# Patient Record
Sex: MALE | Race: OTHER | HISPANIC OR LATINO | URBAN - METROPOLITAN AREA
[De-identification: names, ages, dates, MRNs, and addresses within clinical notes are randomized per-mention and may not be internally consistent; named-entity substitution may affect disease eponyms.]

---

## 2018-01-13 ENCOUNTER — EMERGENCY (EMERGENCY)
Facility: HOSPITAL | Age: 53
LOS: 1 days | Discharge: HOME | End: 2018-01-13
Attending: EMERGENCY MEDICINE
Payer: SELF-PAY

## 2018-01-13 VITALS
DIASTOLIC BLOOD PRESSURE: 90 MMHG | RESPIRATION RATE: 18 BRPM | TEMPERATURE: 98 F | SYSTOLIC BLOOD PRESSURE: 151 MMHG | OXYGEN SATURATION: 100 % | HEART RATE: 84 BPM

## 2018-01-13 VITALS
SYSTOLIC BLOOD PRESSURE: 158 MMHG | DIASTOLIC BLOOD PRESSURE: 98 MMHG | RESPIRATION RATE: 20 BRPM | TEMPERATURE: 98 F | OXYGEN SATURATION: 99 % | HEART RATE: 88 BPM

## 2018-01-13 PROCEDURE — 99283 EMERGENCY DEPT VISIT LOW MDM: CPT

## 2018-01-13 RX ORDER — IBUPROFEN 200 MG
1 TABLET ORAL
Qty: 15 | Refills: 0
Start: 2018-01-13 | End: 2018-01-17

## 2018-01-13 RX ORDER — CYCLOBENZAPRINE HYDROCHLORIDE 10 MG/1
1 TABLET, FILM COATED ORAL
Qty: 5 | Refills: 0
Start: 2018-01-13 | End: 2018-01-17

## 2018-01-13 RX ORDER — IBUPROFEN 200 MG
600 TABLET ORAL ONCE
Qty: 0 | Refills: 0 | Status: COMPLETED | OUTPATIENT
Start: 2018-01-13 | End: 2018-01-13

## 2018-01-13 RX ADMIN — Medication 600 MILLIGRAM(S): at 19:48
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
## 2018-01-13 NOTE — ED PROVIDER NOTE - CHPI ED SYMPTOMS NEG
no fever, no chills, no visual changes, no shortness of breath, no cough, no chest pain, no palpitations, no nausea, no vomiting, no diarrhea, no abd pain, no dysuria, no urinary frequency, no hematuria, no urinary/bowel incontinence, no numbness, no tingling, no weakness, no saddle anesthesia, no LOC
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
## 2020-03-30 ENCOUNTER — INPATIENT (INPATIENT)
Facility: HOSPITAL | Age: 55
LOS: 0 days | Discharge: OTHER INSTITUTION | DRG: 193 | End: 2020-03-31
Attending: INTERNAL MEDICINE | Admitting: INTERNAL MEDICINE
Payer: MEDICAID

## 2020-03-30 VITALS
WEIGHT: 214.95 LBS | TEMPERATURE: 99 F | RESPIRATION RATE: 20 BRPM | DIASTOLIC BLOOD PRESSURE: 88 MMHG | HEIGHT: 65 IN | OXYGEN SATURATION: 81 % | SYSTOLIC BLOOD PRESSURE: 137 MMHG | HEART RATE: 107 BPM

## 2020-03-31 ENCOUNTER — EMERGENCY (EMERGENCY)
Facility: HOSPITAL | Age: 55
LOS: 0 days | Discharge: HOME | End: 2020-03-31
Attending: EMERGENCY MEDICINE

## 2020-03-31 ENCOUNTER — INPATIENT (INPATIENT)
Facility: HOSPITAL | Age: 55
LOS: 11 days | Discharge: HOME | End: 2020-04-12
Attending: INTERNAL MEDICINE | Admitting: INTERNAL MEDICINE
Payer: MEDICAID

## 2020-03-31 VITALS
SYSTOLIC BLOOD PRESSURE: 130 MMHG | OXYGEN SATURATION: 90 % | DIASTOLIC BLOOD PRESSURE: 67 MMHG | TEMPERATURE: 100 F | HEART RATE: 84 BPM | RESPIRATION RATE: 26 BRPM

## 2020-03-31 VITALS
OXYGEN SATURATION: 98 % | SYSTOLIC BLOOD PRESSURE: 148 MMHG | TEMPERATURE: 98 F | RESPIRATION RATE: 20 BRPM | HEART RATE: 79 BPM | DIASTOLIC BLOOD PRESSURE: 83 MMHG

## 2020-03-31 VITALS
DIASTOLIC BLOOD PRESSURE: 79 MMHG | HEART RATE: 89 BPM | OXYGEN SATURATION: 82 % | TEMPERATURE: 100 F | RESPIRATION RATE: 18 BRPM | SYSTOLIC BLOOD PRESSURE: 131 MMHG

## 2020-03-31 VITALS — HEIGHT: 65 IN

## 2020-03-31 DIAGNOSIS — R06.02 SHORTNESS OF BREATH: ICD-10-CM

## 2020-03-31 DIAGNOSIS — A41.89 OTHER SPECIFIED SEPSIS: ICD-10-CM

## 2020-03-31 DIAGNOSIS — J96.00 ACUTE RESPIRATORY FAILURE, UNSPECIFIED WHETHER WITH HYPOXIA OR HYPERCAPNIA: ICD-10-CM

## 2020-03-31 DIAGNOSIS — Z29.9 ENCOUNTER FOR PROPHYLACTIC MEASURES, UNSPECIFIED: ICD-10-CM

## 2020-03-31 DIAGNOSIS — J18.9 PNEUMONIA, UNSPECIFIED ORGANISM: ICD-10-CM

## 2020-03-31 DIAGNOSIS — R74.0 NONSPECIFIC ELEVATION OF LEVELS OF TRANSAMINASE AND LACTIC ACID DEHYDROGENASE [LDH]: ICD-10-CM

## 2020-03-31 DIAGNOSIS — E87.1 HYPO-OSMOLALITY AND HYPONATREMIA: ICD-10-CM

## 2020-03-31 DIAGNOSIS — Z02.9 ENCOUNTER FOR ADMINISTRATIVE EXAMINATIONS, UNSPECIFIED: ICD-10-CM

## 2020-03-31 DIAGNOSIS — L40.9 PSORIASIS, UNSPECIFIED: ICD-10-CM

## 2020-03-31 DIAGNOSIS — R65.20 SEVERE SEPSIS WITHOUT SEPTIC SHOCK: ICD-10-CM

## 2020-03-31 DIAGNOSIS — U07.1 COVID-19: ICD-10-CM

## 2020-03-31 DIAGNOSIS — Z78.1 PHYSICAL RESTRAINT STATUS: ICD-10-CM

## 2020-03-31 DIAGNOSIS — J80 ACUTE RESPIRATORY DISTRESS SYNDROME: ICD-10-CM

## 2020-03-31 DIAGNOSIS — E87.2 ACIDOSIS: ICD-10-CM

## 2020-03-31 DIAGNOSIS — J12.89 OTHER VIRAL PNEUMONIA: ICD-10-CM

## 2020-03-31 LAB
4/8 RATIO: 3.13 RATIO — SIGNIFICANT CHANGE UP (ref 0.9–3.6)
ABS CD8: 170 /UL — SIGNIFICANT CHANGE UP (ref 142–740)
ALBUMIN SERPL ELPH-MCNC: 2.5 G/DL — LOW (ref 3.5–5)
ALBUMIN SERPL ELPH-MCNC: 2.9 G/DL — LOW (ref 3.5–5)
ALP SERPL-CCNC: 73 U/L — SIGNIFICANT CHANGE UP (ref 40–120)
ALP SERPL-CCNC: 80 U/L — SIGNIFICANT CHANGE UP (ref 40–120)
ALT FLD-CCNC: 57 U/L DA — SIGNIFICANT CHANGE UP (ref 10–60)
ALT FLD-CCNC: 61 U/L DA — HIGH (ref 10–60)
ANION GAP SERPL CALC-SCNC: 7 MMOL/L — SIGNIFICANT CHANGE UP (ref 5–17)
ANION GAP SERPL CALC-SCNC: 7 MMOL/L — SIGNIFICANT CHANGE UP (ref 5–17)
AST SERPL-CCNC: 74 U/L — HIGH (ref 10–40)
AST SERPL-CCNC: 75 U/L — HIGH (ref 10–40)
BASOPHILS # BLD AUTO: 0.01 K/UL — SIGNIFICANT CHANGE UP (ref 0–0.2)
BASOPHILS # BLD AUTO: 0.01 K/UL — SIGNIFICANT CHANGE UP (ref 0–0.2)
BASOPHILS NFR BLD AUTO: 0.1 % — SIGNIFICANT CHANGE UP (ref 0–2)
BASOPHILS NFR BLD AUTO: 0.1 % — SIGNIFICANT CHANGE UP (ref 0–2)
BILIRUB SERPL-MCNC: 0.3 MG/DL — SIGNIFICANT CHANGE UP (ref 0.2–1.2)
BILIRUB SERPL-MCNC: 0.4 MG/DL — SIGNIFICANT CHANGE UP (ref 0.2–1.2)
BUN SERPL-MCNC: 7 MG/DL — SIGNIFICANT CHANGE UP (ref 7–18)
BUN SERPL-MCNC: 8 MG/DL — SIGNIFICANT CHANGE UP (ref 7–18)
CALCIUM SERPL-MCNC: 8.1 MG/DL — LOW (ref 8.4–10.5)
CALCIUM SERPL-MCNC: 8.2 MG/DL — LOW (ref 8.4–10.5)
CD16+CD56+ CELLS NFR BLD: 13 % — SIGNIFICANT CHANGE UP (ref 5–23)
CD16+CD56+ CELLS NFR SPEC: 112 /UL — SIGNIFICANT CHANGE UP (ref 71–410)
CD19 BLASTS SPEC-ACNC: 70 /UL — LOW (ref 84–469)
CD19 BLASTS SPEC-ACNC: 8 % — SIGNIFICANT CHANGE UP (ref 6–24)
CD3 BLASTS SPEC-ACNC: 701 /UL — SIGNIFICANT CHANGE UP (ref 672–1870)
CD3 BLASTS SPEC-ACNC: 77 % — SIGNIFICANT CHANGE UP (ref 59–83)
CD4 %: 57 % — SIGNIFICANT CHANGE UP (ref 30–62)
CD8 %: 18 % — SIGNIFICANT CHANGE UP (ref 12–36)
CHLORIDE SERPL-SCNC: 102 MMOL/L — SIGNIFICANT CHANGE UP (ref 96–108)
CHLORIDE SERPL-SCNC: 98 MMOL/L — SIGNIFICANT CHANGE UP (ref 96–108)
CHOLEST SERPL-MCNC: 129 MG/DL — SIGNIFICANT CHANGE UP (ref 10–199)
CK SERPL-CCNC: 109 U/L — SIGNIFICANT CHANGE UP (ref 35–232)
CO2 SERPL-SCNC: 28 MMOL/L — SIGNIFICANT CHANGE UP (ref 22–31)
CO2 SERPL-SCNC: 29 MMOL/L — SIGNIFICANT CHANGE UP (ref 22–31)
CREAT SERPL-MCNC: 0.7 MG/DL — SIGNIFICANT CHANGE UP (ref 0.5–1.3)
CREAT SERPL-MCNC: 0.8 MG/DL — SIGNIFICANT CHANGE UP (ref 0.5–1.3)
CRP SERPL-MCNC: 13.94 MG/DL — HIGH (ref 0–0.4)
D DIMER BLD IA.RAPID-MCNC: 364 NG/ML DDU — HIGH
EOSINOPHIL # BLD AUTO: 0 K/UL — SIGNIFICANT CHANGE UP (ref 0–0.5)
EOSINOPHIL # BLD AUTO: 0.04 K/UL — SIGNIFICANT CHANGE UP (ref 0–0.5)
EOSINOPHIL NFR BLD AUTO: 0 % — SIGNIFICANT CHANGE UP (ref 0–6)
EOSINOPHIL NFR BLD AUTO: 0.5 % — SIGNIFICANT CHANGE UP (ref 0–6)
FERRITIN SERPL-MCNC: 2159 NG/ML — HIGH (ref 30–400)
FLU A RESULT: SIGNIFICANT CHANGE UP
FLU A RESULT: SIGNIFICANT CHANGE UP
FLUAV AG NPH QL: SIGNIFICANT CHANGE UP
FLUBV AG NPH QL: SIGNIFICANT CHANGE UP
FOLATE SERPL-MCNC: 12.7 NG/ML — SIGNIFICANT CHANGE UP
GLUCOSE SERPL-MCNC: 125 MG/DL — HIGH (ref 70–99)
GLUCOSE SERPL-MCNC: 143 MG/DL — HIGH (ref 70–99)
HBA1C BLD-MCNC: 7.3 % — HIGH (ref 4–5.6)
HCT VFR BLD CALC: 41.5 % — SIGNIFICANT CHANGE UP (ref 39–50)
HCT VFR BLD CALC: 41.6 % — SIGNIFICANT CHANGE UP (ref 39–50)
HDLC SERPL-MCNC: 37 MG/DL — LOW
HGB BLD-MCNC: 14.3 G/DL — SIGNIFICANT CHANGE UP (ref 13–17)
HGB BLD-MCNC: 14.8 G/DL — SIGNIFICANT CHANGE UP (ref 13–17)
IMM GRANULOCYTES NFR BLD AUTO: 0.5 % — SIGNIFICANT CHANGE UP (ref 0–1.5)
IMM GRANULOCYTES NFR BLD AUTO: 0.7 % — SIGNIFICANT CHANGE UP (ref 0–1.5)
INR BLD: 1.32 RATIO — HIGH (ref 0.88–1.16)
LACTATE SERPL-SCNC: 1.4 MMOL/L — SIGNIFICANT CHANGE UP (ref 0.7–2)
LDH SERPL L TO P-CCNC: 615 U/L — HIGH (ref 120–225)
LIPID PNL WITH DIRECT LDL SERPL: 61 MG/DL — SIGNIFICANT CHANGE UP
LYMPHOCYTES # BLD AUTO: 0.99 K/UL — LOW (ref 1–3.3)
LYMPHOCYTES # BLD AUTO: 1.06 K/UL — SIGNIFICANT CHANGE UP (ref 1–3.3)
LYMPHOCYTES # BLD AUTO: 13.3 % — SIGNIFICANT CHANGE UP (ref 13–44)
LYMPHOCYTES # BLD AUTO: 13.9 % — SIGNIFICANT CHANGE UP (ref 13–44)
MAGNESIUM SERPL-MCNC: 2.5 MG/DL — SIGNIFICANT CHANGE UP (ref 1.6–2.6)
MCHC RBC-ENTMCNC: 29.1 PG — SIGNIFICANT CHANGE UP (ref 27–34)
MCHC RBC-ENTMCNC: 29.9 PG — SIGNIFICANT CHANGE UP (ref 27–34)
MCHC RBC-ENTMCNC: 34.5 GM/DL — SIGNIFICANT CHANGE UP (ref 32–36)
MCHC RBC-ENTMCNC: 35.6 GM/DL — SIGNIFICANT CHANGE UP (ref 32–36)
MCV RBC AUTO: 84 FL — SIGNIFICANT CHANGE UP (ref 80–100)
MCV RBC AUTO: 84.5 FL — SIGNIFICANT CHANGE UP (ref 80–100)
MONOCYTES # BLD AUTO: 0.25 K/UL — SIGNIFICANT CHANGE UP (ref 0–0.9)
MONOCYTES # BLD AUTO: 0.35 K/UL — SIGNIFICANT CHANGE UP (ref 0–0.9)
MONOCYTES NFR BLD AUTO: 3.4 % — SIGNIFICANT CHANGE UP (ref 2–14)
MONOCYTES NFR BLD AUTO: 4.6 % — SIGNIFICANT CHANGE UP (ref 2–14)
NEUTROPHILS # BLD AUTO: 6.09 K/UL — SIGNIFICANT CHANGE UP (ref 1.8–7.4)
NEUTROPHILS # BLD AUTO: 6.15 K/UL — SIGNIFICANT CHANGE UP (ref 1.8–7.4)
NEUTROPHILS NFR BLD AUTO: 80.7 % — HIGH (ref 43–77)
NEUTROPHILS NFR BLD AUTO: 82.2 % — HIGH (ref 43–77)
NRBC # BLD: 0 /100 WBCS — SIGNIFICANT CHANGE UP (ref 0–0)
NRBC # BLD: 0 /100 WBCS — SIGNIFICANT CHANGE UP (ref 0–0)
PHOSPHATE SERPL-MCNC: 2.4 MG/DL — LOW (ref 2.5–4.5)
PLATELET # BLD AUTO: 232 K/UL — SIGNIFICANT CHANGE UP (ref 150–400)
PLATELET # BLD AUTO: 243 K/UL — SIGNIFICANT CHANGE UP (ref 150–400)
POTASSIUM SERPL-MCNC: 3.6 MMOL/L — SIGNIFICANT CHANGE UP (ref 3.5–5.3)
POTASSIUM SERPL-MCNC: 3.8 MMOL/L — SIGNIFICANT CHANGE UP (ref 3.5–5.3)
POTASSIUM SERPL-SCNC: 3.6 MMOL/L — SIGNIFICANT CHANGE UP (ref 3.5–5.3)
POTASSIUM SERPL-SCNC: 3.8 MMOL/L — SIGNIFICANT CHANGE UP (ref 3.5–5.3)
PROCALCITONIN SERPL-MCNC: 0.11 NG/ML — HIGH (ref 0.02–0.1)
PROT SERPL-MCNC: 7.6 G/DL — SIGNIFICANT CHANGE UP (ref 6–8.3)
PROT SERPL-MCNC: 8.1 G/DL — SIGNIFICANT CHANGE UP (ref 6–8.3)
PROTHROM AB SERPL-ACNC: 15 SEC — HIGH (ref 10–12.9)
RAPID RVP RESULT: SIGNIFICANT CHANGE UP
RBC # BLD: 4.91 M/UL — SIGNIFICANT CHANGE UP (ref 4.2–5.8)
RBC # BLD: 4.95 M/UL — SIGNIFICANT CHANGE UP (ref 4.2–5.8)
RBC # FLD: 11.8 % — SIGNIFICANT CHANGE UP (ref 10.3–14.5)
RBC # FLD: 12.1 % — SIGNIFICANT CHANGE UP (ref 10.3–14.5)
RSV RESULT: SIGNIFICANT CHANGE UP
RSV RNA RESP QL NAA+PROBE: SIGNIFICANT CHANGE UP
SODIUM SERPL-SCNC: 134 MMOL/L — LOW (ref 135–145)
SODIUM SERPL-SCNC: 137 MMOL/L — SIGNIFICANT CHANGE UP (ref 135–145)
T-CELL CD4 SUBSET PNL BLD: 533 /UL — SIGNIFICANT CHANGE UP (ref 489–1457)
TOTAL CHOLESTEROL/HDL RATIO MEASUREMENT: 3.5 RATIO — SIGNIFICANT CHANGE UP (ref 3.4–9.6)
TRIGL SERPL-MCNC: 156 MG/DL — HIGH (ref 10–149)
TROPONIN I SERPL-MCNC: 0.03 NG/ML — SIGNIFICANT CHANGE UP (ref 0–0.04)
TSH SERPL-MCNC: 2.93 UU/ML — SIGNIFICANT CHANGE UP (ref 0.34–4.82)
VIT B12 SERPL-MCNC: 1139 PG/ML — SIGNIFICANT CHANGE UP (ref 232–1245)
WBC # BLD: 7.42 K/UL — SIGNIFICANT CHANGE UP (ref 3.8–10.5)
WBC # BLD: 7.62 K/UL — SIGNIFICANT CHANGE UP (ref 3.8–10.5)
WBC # FLD AUTO: 7.42 K/UL — SIGNIFICANT CHANGE UP (ref 3.8–10.5)
WBC # FLD AUTO: 7.62 K/UL — SIGNIFICANT CHANGE UP (ref 3.8–10.5)

## 2020-03-31 PROCEDURE — 82955 ASSAY OF G6PD ENZYME: CPT

## 2020-03-31 PROCEDURE — 82607 VITAMIN B-12: CPT

## 2020-03-31 PROCEDURE — 96374 THER/PROPH/DIAG INJ IV PUSH: CPT

## 2020-03-31 PROCEDURE — 99285 EMERGENCY DEPT VISIT HI MDM: CPT

## 2020-03-31 PROCEDURE — 99053 MED SERV 10PM-8AM 24 HR FAC: CPT

## 2020-03-31 PROCEDURE — 93005 ELECTROCARDIOGRAM TRACING: CPT

## 2020-03-31 PROCEDURE — 80053 COMPREHEN METABOLIC PANEL: CPT

## 2020-03-31 PROCEDURE — 87798 DETECT AGENT NOS DNA AMP: CPT

## 2020-03-31 PROCEDURE — 71045 X-RAY EXAM CHEST 1 VIEW: CPT | Mod: 26

## 2020-03-31 PROCEDURE — 71045 X-RAY EXAM CHEST 1 VIEW: CPT

## 2020-03-31 PROCEDURE — 83605 ASSAY OF LACTIC ACID: CPT

## 2020-03-31 PROCEDURE — 82550 ASSAY OF CK (CPK): CPT

## 2020-03-31 PROCEDURE — 82728 ASSAY OF FERRITIN: CPT

## 2020-03-31 PROCEDURE — 83735 ASSAY OF MAGNESIUM: CPT

## 2020-03-31 PROCEDURE — 84100 ASSAY OF PHOSPHORUS: CPT

## 2020-03-31 PROCEDURE — 87581 M.PNEUMON DNA AMP PROBE: CPT

## 2020-03-31 PROCEDURE — 83615 LACTATE (LD) (LDH) ENZYME: CPT

## 2020-03-31 PROCEDURE — 83036 HEMOGLOBIN GLYCOSYLATED A1C: CPT

## 2020-03-31 PROCEDURE — 83519 RIA NONANTIBODY: CPT

## 2020-03-31 PROCEDURE — 86140 C-REACTIVE PROTEIN: CPT

## 2020-03-31 PROCEDURE — 86738 MYCOPLASMA ANTIBODY: CPT

## 2020-03-31 PROCEDURE — 87635 SARS-COV-2 COVID-19 AMP PRB: CPT

## 2020-03-31 PROCEDURE — 87633 RESP VIRUS 12-25 TARGETS: CPT

## 2020-03-31 PROCEDURE — 85027 COMPLETE CBC AUTOMATED: CPT

## 2020-03-31 PROCEDURE — 84443 ASSAY THYROID STIM HORMONE: CPT

## 2020-03-31 PROCEDURE — 86357 NK CELLS TOTAL COUNT: CPT

## 2020-03-31 PROCEDURE — 87486 CHLMYD PNEUM DNA AMP PROBE: CPT

## 2020-03-31 PROCEDURE — 84145 PROCALCITONIN (PCT): CPT

## 2020-03-31 PROCEDURE — 86355 B CELLS TOTAL COUNT: CPT

## 2020-03-31 PROCEDURE — 85610 PROTHROMBIN TIME: CPT

## 2020-03-31 PROCEDURE — 85379 FIBRIN DEGRADATION QUANT: CPT

## 2020-03-31 PROCEDURE — 80061 LIPID PANEL: CPT

## 2020-03-31 PROCEDURE — 84484 ASSAY OF TROPONIN QUANT: CPT

## 2020-03-31 PROCEDURE — 87631 RESP VIRUS 3-5 TARGETS: CPT

## 2020-03-31 PROCEDURE — 36415 COLL VENOUS BLD VENIPUNCTURE: CPT

## 2020-03-31 PROCEDURE — 82746 ASSAY OF FOLIC ACID SERUM: CPT

## 2020-03-31 RX ORDER — HYDROXYCHLOROQUINE SULFATE 200 MG
TABLET ORAL
Refills: 0 | Status: COMPLETED | OUTPATIENT
Start: 2020-03-31 | End: 2020-04-05

## 2020-03-31 RX ORDER — CEFTRIAXONE 500 MG/1
1000 INJECTION, POWDER, FOR SOLUTION INTRAMUSCULAR; INTRAVENOUS EVERY 24 HOURS
Refills: 0 | Status: DISCONTINUED | OUTPATIENT
Start: 2020-03-31 | End: 2020-03-31

## 2020-03-31 RX ORDER — HYDROXYCHLOROQUINE SULFATE 200 MG
400 TABLET ORAL EVERY 12 HOURS
Refills: 0 | Status: COMPLETED | OUTPATIENT
Start: 2020-03-31 | End: 2020-04-01

## 2020-03-31 RX ORDER — CEFTRIAXONE 500 MG/1
1000 INJECTION, POWDER, FOR SOLUTION INTRAMUSCULAR; INTRAVENOUS ONCE
Refills: 0 | Status: COMPLETED | OUTPATIENT
Start: 2020-03-31 | End: 2020-03-31

## 2020-03-31 RX ORDER — ENOXAPARIN SODIUM 100 MG/ML
40 INJECTION SUBCUTANEOUS AT BEDTIME
Refills: 0 | Status: DISCONTINUED | OUTPATIENT
Start: 2020-03-31 | End: 2020-04-10

## 2020-03-31 RX ORDER — SODIUM CHLORIDE 9 MG/ML
1900 INJECTION INTRAMUSCULAR; INTRAVENOUS; SUBCUTANEOUS ONCE
Refills: 0 | Status: COMPLETED | OUTPATIENT
Start: 2020-03-31 | End: 2020-03-31

## 2020-03-31 RX ORDER — ACETAMINOPHEN 500 MG
650 TABLET ORAL ONCE
Refills: 0 | Status: COMPLETED | OUTPATIENT
Start: 2020-03-31 | End: 2020-03-31

## 2020-03-31 RX ORDER — CHLORHEXIDINE GLUCONATE 213 G/1000ML
1 SOLUTION TOPICAL
Refills: 0 | Status: DISCONTINUED | OUTPATIENT
Start: 2020-03-31 | End: 2020-04-12

## 2020-03-31 RX ORDER — ACETAMINOPHEN 500 MG
975 TABLET ORAL ONCE
Refills: 0 | Status: COMPLETED | OUTPATIENT
Start: 2020-03-31 | End: 2020-03-31

## 2020-03-31 RX ORDER — AZITHROMYCIN 500 MG/1
500 TABLET, FILM COATED ORAL ONCE
Refills: 0 | Status: COMPLETED | OUTPATIENT
Start: 2020-03-31 | End: 2020-03-31

## 2020-03-31 RX ORDER — AZITHROMYCIN 500 MG/1
500 TABLET, FILM COATED ORAL DAILY
Refills: 0 | Status: DISCONTINUED | OUTPATIENT
Start: 2020-03-31 | End: 2020-03-31

## 2020-03-31 RX ORDER — ENOXAPARIN SODIUM 100 MG/ML
40 INJECTION SUBCUTANEOUS DAILY
Refills: 0 | Status: DISCONTINUED | OUTPATIENT
Start: 2020-03-31 | End: 2020-03-31

## 2020-03-31 RX ORDER — ACETAMINOPHEN 500 MG
650 TABLET ORAL EVERY 6 HOURS
Refills: 0 | Status: DISCONTINUED | OUTPATIENT
Start: 2020-03-31 | End: 2020-03-31

## 2020-03-31 RX ORDER — HYDROXYCHLOROQUINE SULFATE 200 MG
200 TABLET ORAL EVERY 12 HOURS
Refills: 0 | Status: COMPLETED | OUTPATIENT
Start: 2020-04-01 | End: 2020-04-05

## 2020-03-31 RX ADMIN — AZITHROMYCIN 500 MILLIGRAM(S): 500 TABLET, FILM COATED ORAL at 01:33

## 2020-03-31 RX ADMIN — Medication 975 MILLIGRAM(S): at 01:33

## 2020-03-31 RX ADMIN — ENOXAPARIN SODIUM 40 MILLIGRAM(S): 100 INJECTION SUBCUTANEOUS at 12:42

## 2020-03-31 RX ADMIN — CEFTRIAXONE 100 MILLIGRAM(S): 500 INJECTION, POWDER, FOR SOLUTION INTRAMUSCULAR; INTRAVENOUS at 10:27

## 2020-03-31 RX ADMIN — ENOXAPARIN SODIUM 40 MILLIGRAM(S): 100 INJECTION SUBCUTANEOUS at 22:06

## 2020-03-31 RX ADMIN — SODIUM CHLORIDE 1900 MILLILITER(S): 9 INJECTION INTRAMUSCULAR; INTRAVENOUS; SUBCUTANEOUS at 01:32

## 2020-03-31 RX ADMIN — Medication 650 MILLIGRAM(S): at 10:27

## 2020-03-31 RX ADMIN — AZITHROMYCIN 500 MILLIGRAM(S): 500 TABLET, FILM COATED ORAL at 12:42

## 2020-03-31 RX ADMIN — Medication 400 MILLIGRAM(S): at 22:05

## 2020-03-31 RX ADMIN — CEFTRIAXONE 100 MILLIGRAM(S): 500 INJECTION, POWDER, FOR SOLUTION INTRAMUSCULAR; INTRAVENOUS at 01:31

## 2020-03-31 NOTE — ED ADULT NURSE NOTE - CHIEF COMPLAINT QUOTE
pt presents to [facility], transfer from [place], + covid, fever x 5 days, chills, SOB and body aches.

## 2020-03-31 NOTE — H&P ADULT - PROBLEM SELECTOR PLAN 1
Afebrile, no leucocytosis  Flu negative   CXR : Bilateral opacities are present and has Multifocal PNA.  s/p 1 dose of Ceftriaxone and Zithromax one dose in ED  s/p 1.9L in ED    f/u  COVID, RVP  f/u D dimer, Ferritin, LDH, Procalcitonin, Tcell subset  f/u Strep, Mycoplasma, Legionella  Isolation precautions  Started on .Ceftriaxone and Zithromax On admission he was saturating 80% on RA and 86% on walking. Currently he is saturating 93% on 2L NC. He is speaking in full sentences, not tachypneic.  Afebrile, no leucocytosis  Flu negative   CXR : Bilateral opacities are present and has Multifocal PNA.  s/p 1 dose of Ceftriaxone and Zithromax one dose in ED  s/p 1.9L in ED    f/u  COVID, RVP  f/u D dimer, Ferritin, LDH, Procalcitonin, Tcell subset  f/u Strep, Mycoplasma, Legionella  Isolation precautions  Started on .Ceftriaxone and Zithromax  c/w Oxygen Supplementation

## 2020-03-31 NOTE — ED PROVIDER NOTE - OBJECTIVE STATEMENT
56 yo M denies pmh presents with fever x 5 days with cough and gradual onset SOB. Denies other acute complaints. Family sick at home w/ similar complaints.

## 2020-03-31 NOTE — H&P ADULT - PROBLEM SELECTOR PLAN 5
IMPROVE VTE Individual Risk Assessment    RISK                                                                Points  [  ] Previous VTE                                                  3  [  ] Thrombophilia                                               2  [  ] Lower limb paralysis                                      2        (unable to hold up >15 seconds)    [  ] Current Cancer                                              2         (within 6 months)  [x ] Immobilization > 24 hrs                                1  [  ] ICU/CCU stay > 24 hours                              1  [  ] Age > 60                                                      1  IMPROVE VTE Score _1__DVT ppx Lovenox 40 sub q______  )

## 2020-03-31 NOTE — PATIENT PROFILE ADULT - NSASFUNCLEVELADLAMBULATE_GEN_A_NUR
Patient complaining of weakness. Will need assistance with ambulating and toileting./1 = assistive equipment Patient complaining of weakness. Will need assistance with ambulating and toileting./2 = assistive person

## 2020-03-31 NOTE — CHART NOTE - NSCHARTNOTEFT_GEN_A_CORE
Received Sign out from Dr. Rubin.    Patient is a 56 yo male with PMH of psoriasis comes with chiwefc/o fever x 15 days a/w chills,  cough, SOB. Known COVID+ family contact.  Transferred from [place] for bed convenience.  Orders placed.     Follow up morning labs, xray in AM.

## 2020-03-31 NOTE — H&P ADULT - PROBLEM SELECTOR PLAN 2
f/u Acute Hepatitis panel Afebrile, no leucocytosis  Flu negative   CXR : Bilateral opacities are present and has Multifocal PNA.  s/p 1 dose of Ceftriaxone and Zithromax one dose in ED  s/p 1.9L in ED    f/u  COVID, RVP  f/u D dimer, Ferritin, LDH, Procalcitonin, Tcell subset  f/u Strep, Mycoplasma, Legionella  Isolation precautions  Started on .Ceftriaxone and Zithromax

## 2020-03-31 NOTE — H&P ADULT - ASSESSMENT
56 y/o M from home, walks independently, lives with family with PMH of Psoriasis and no PSH presents with fever x 15 days.  ED vitals are stable except RR 20 and saturating 93 on 2L NC.  s/p Ceftriaxone and zithromax one dose   s/p 1.9L NS   Admitted to medicine for PNA and suspicion for COVID.

## 2020-03-31 NOTE — ED PROVIDER NOTE - PROGRESS NOTE DETAILS
No PCP, will admit to unattached Dr Osborn. labs - transaminitis  CXR - multifocal PNA   No PCP, will admit to unattached Dr Osborn. Endorsed to [facility].

## 2020-03-31 NOTE — ED PROVIDER NOTE - NS ED ROS FT
CONSTITUTIONAL: +fever, no chills   EYES: no visual changes, no eye pain   ENMT: no nasal congestion, no throat pain  CARDIOVASCULAR: no chest pain, no edema, no palpitations   RESPIRATORY: +shortness of breath, +cough   GASTROINTESTINAL: no abdominal pain, no nausea, no vomiting, no diarrhea, no constipation   GENITOURINARY: no dysuria, no frequency  MUSCULOSKELETAL: no joint pains, no myalgias, no back pain   SKIN: no rashes  NEUROLOGICAL: no weakness, no headache, no dizziness, no slurred speech, no syncope   PSYCHIATRIC: no known mental health illness   HEME/LYMPH: no lymphadenopathy      All other ROS negative except as per HPI

## 2020-03-31 NOTE — ED PROVIDER NOTE - PHYSICAL EXAMINATION
GENERAL: well appearing, no acute distress   HEAD: atraumatic   EYES: EOMI, pink conjunctiva   ENT: moist oral mucosa   CARDIAC: RRR, no edema, distal pulses present   RESPIRATORY: lungs CTAB, no increased work of breathing at rest, but becomes tachypneic w/ accessory muscle use and oxygen sat 86% when walking   GASTROINTESTINAL: no abdominal tenderness, no rebound or guarding, bowel sounds presents  GENITOURINARY: no CVA tenderness   MUSCULOSKELETAL: no deformity   NEUROLOGICAL: AAOx3, spontaneous movement of extremities   SKIN: intact   PSYCHIATRIC: cooperative  HEME LYMPH: no lymphadenopathy

## 2020-03-31 NOTE — ED ADULT NURSE NOTE - OBJECTIVE STATEMENT
Patient transferred from [facility] for fever, SOB and cough. Patient positive for COVID19.

## 2020-03-31 NOTE — ED ADULT NURSE NOTE - NSIMPLEMENTINTERV_GEN_ALL_ED
Implemented All Fall with Harm Risk Interventions:  [place] to call system. Call bell, personal items and telephone within reach. Instruct patient to call for assistance. Room bathroom lighting operational. Non-slip footwear when patient is off stretcher. Physically safe environment: no spills, clutter or unnecessary equipment. Stretcher in lowest position, wheels locked, appropriate side rails in place. Provide visual cue, wrist band, yellow gown, etc. Monitor gait and stability. Monitor for mental status changes and reorient to person, place, and time. Review medications for side effects contributing to fall risk. Reinforce activity limits and safety measures with patient and family. Provide visual clues: red socks.

## 2020-03-31 NOTE — H&P ADULT - NSHPLABSRESULTS_GEN_ALL_CORE
LABS:                        14.3   7.42  )-----------( 243      ( 31 Mar 2020 11:27 )             41.5                         14.8   7.62  )-----------( 232      ( 31 Mar 2020 02:01 )             41.6     03-31    134<L>  |  98  |  7   ----------------------------<  143<H>  3.6   |  29  |  0.80    Ca    8.2<L>      31 Mar 2020 02:01    TPro  8.1  /  Alb  2.9<L>  /  TBili  0.4  /  DBili  x   /  AST  75<H>  /  ALT  61<H>  /  AlkPhos  80  03-31    PT/INR - ( 31 Mar 2020 11:27 )   PT: 15.0 sec;   INR: 1.32 ratio             LIVER FUNCTIONS - ( 31 Mar 2020 02:01 )  Alb: 2.9 g/dL / Pro: 8.1 g/dL / ALK PHOS: 80 U/L / ALT: 61 U/L DA / AST: 75 U/L / GGT: x           Lactate, Blood: 1.4 mmol/L (03-31-20 @ 11:27)

## 2020-03-31 NOTE — ED PROVIDER NOTE - CARE PLAN
Principal Discharge DX:	SOB (shortness of breath) Principal Discharge DX:	SOB (shortness of breath)  Secondary Diagnosis:	Respiratory failure with hypoxia

## 2020-03-31 NOTE — H&P ADULT - NSHPREVIEWOFSYSTEMS_GEN_ALL_CORE
REVIEW OF SYSTEMS:    CONSTITUTIONAL: , fevers or chills  EYES/ENT: No visual changes;  No vertigo or throat pain   NECK: No pain or stiffness  RESPIRATORY:  shortness of breath  CARDIOVASCULAR: No chest pain or palpitations  GASTROINTESTINAL: No abdominal or epigastric pain. No nausea, vomiting, or hematemesis; No diarrhea or constipation. No melena or hematochezia.  GENITOURINARY: No dysuria, frequency or hematuria  NEUROLOGICAL: No numbness or weakness  SKIN: No itching, burning, rashes, or lesions   All other review of systems is negative unless indicated above.

## 2020-03-31 NOTE — ED ADULT NURSE NOTE - NS ED NURSE RECORD ANOTHER HT AND WT
[Alert] : alert [No Acute Distress] : no acute distress [Well Nourished] : well nourished [Well Developed] : well developed [Normal Sclera/Conjunctiva] : normal sclera/conjunctiva [EOMI] : extra ocular movement intact [No Proptosis] : no proptosis [Normal Oropharynx] : the oropharynx was normal [Thyroid Not Enlarged] : the thyroid was not enlarged [No Thyroid Nodules] : there were no palpable thyroid nodules [No Respiratory Distress] : no respiratory distress [No Accessory Muscle Use] : no accessory muscle use [Clear to Auscultation] : lungs were clear to auscultation bilaterally [Normal Rate] : heart rate was normal  [Normal S1, S2] : normal S1 and S2 [Regular Rhythm] : with a regular rhythm [No Edema] : there was no peripheral edema [Normal Bowel Sounds] : normal bowel sounds [Not Tender] : non-tender [Soft] : abdomen soft [Not Distended] : not distended Yes [Post Cervical Nodes] : posterior cervical nodes [Anterior Cervical Nodes] : anterior cervical nodes [Normal] : normal and non tender [No Spinal Tenderness] : no spinal tenderness [Spine Straight] : spine straight [No Stigmata of Cushings Syndrome] : no stigmata of cushings syndrome [Normal Gait] : normal gait [Normal Strength/Tone] : muscle strength and tone were normal [No Rash] : no rash [Normal Reflexes] : deep tendon reflexes were 2+ and symmetric [No Tremors] : no tremors [Oriented x3] : oriented to person, place, and time [Hirsutism] : no hirsutism [Acanthosis Nigricans] : no acanthosis nigricans
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
## 2020-03-31 NOTE — H&P ADULT - PROBLEM SELECTOR PLAN 3
IMPROVE VTE Individual Risk Assessment    RISK                                                                Points  [  ] Previous VTE                                                  3  [  ] Thrombophilia                                               2  [  ] Lower limb paralysis                                      2        (unable to hold up >15 seconds)    [  ] Current Cancer                                              2         (within 6 months)  [x ] Immobilization > 24 hrs                                1  [  ] ICU/CCU stay > 24 hours                              1  [  ] Age > 60                                                      1  IMPROVE VTE Score _1__DVT ppx Lovenox 40 sub q______  ) Most likely due to Poor oral intake and viral illness  s/p 1.9L NS  f/u repeat bmp  f/u Urine lytes.

## 2020-03-31 NOTE — H&P ADULT - NSHPPHYSICALEXAM_GEN_ALL_CORE
Vital Signs Last 24 Hrs  T(C): 36.9 (31 Mar 2020 11:06), Max: 37.4 (30 Mar 2020 23:13)  T(F): 98.5 (31 Mar 2020 11:06), Max: 99.4 (30 Mar 2020 23:13)  HR: 79 (31 Mar 2020 11:06) (76 - 107)  BP: 148/83 (31 Mar 2020 11:06) (100/60 - 148/83)  BP(mean): 83 (31 Mar 2020 11:06) (83 - 83)  RR: 20 (31 Mar 2020 11:06) (20 - 20)  SpO2: 98% (31 Mar 2020 11:06) (81% - 98%)  PHYSICAL EXAM:  GENERAL: NAD, obese  HEAD:  Atraumatic, Normocephalic  EYES:  conjunctiva and sclera clear  NECK: Supple, No JVD, Normal thyroid  CHEST/LUNG: Clear to auscultation. Clear to percussion bilaterally; No rales, rhonchi, wheezing, or rubs  HEART: Regular rate and rhythm; No murmurs, rubs, or gallops  ABDOMEN: Soft, Nontender, Nondistended; Bowel sounds present  NERVOUS SYSTEM:  Alert & Oriented X3,    EXTREMITIES:  2+ Peripheral Pulses, No clubbing, cyanosis, or edema  SKIN: warm dry Vital Signs Last 24 Hrs  T(C): 36.9 (31 Mar 2020 11:06), Max: 37.4 (30 Mar 2020 23:13)  T(F): 98.5 (31 Mar 2020 11:06), Max: 99.4 (30 Mar 2020 23:13)  HR: 79 (31 Mar 2020 11:06) (76 - 107)  BP: 148/83 (31 Mar 2020 11:06) (100/60 - 148/83)  BP(mean): 83 (31 Mar 2020 11:06) (83 - 83)  RR: 20 (31 Mar 2020 11:06) (20 - 20)  SpO2: 98% (31 Mar 2020 11:06) (81% - 98%)  PHYSICAL EXAM:  GENERAL: NAD, obese  HEAD:  Atraumatic, Normocephalic  EYES:  conjunctiva and sclera clear  NECK: Supple, No JVD, Normal thyroid  CHEST/LUNG: Clear to auscultation. Clear to percussion bilaterally; No rales, rhonchi, wheezing, or rubs  HEART: Regular rate and rhythm; No murmurs, rubs, or gallops  ABDOMEN: Soft, Nontender, Nondistended; Bowel sounds present  NERVOUS SYSTEM:  Alert & Oriented X3,    EXTREMITIES:  2+ Peripheral Pulses, No clubbing, cyanosis, or edema  SKIN: warm dry, bilateral hands has dry skin, psoriasis.

## 2020-03-31 NOTE — H&P ADULT - HISTORY OF PRESENT ILLNESS
56 yo M denies pmh presents with fever x 5 days with cough and gradual onset SOB. Denies other acute complaints. Family sick at home w/ similar complaints 56 y/o M from home, walks independently, lives with family with no PMH and PSH presents with fever x 15 days. It is a/w chills, Cough, SOB, Body aches. His symptoms are His wife is COVID positive and she is sick. 54 y/o M from home, walks independently, lives with family with PMH of Psoriasis and no PSH presents with fever x 15 days. It is a/w chills, Cough, SOB, Body aches. His symptoms are progressively worsening. He stated his Fever was 104F yesterday and is relieved with Tylenol.  His wife is COVID positive and she is sick. Patient denies nausea/vomiting. No diarrhea, abdominal pain.  GOC Full code

## 2020-04-01 LAB
ALBUMIN SERPL ELPH-MCNC: 3.3 G/DL — LOW (ref 3.5–5.2)
ALP SERPL-CCNC: 88 U/L — SIGNIFICANT CHANGE UP (ref 30–115)
ALT FLD-CCNC: 66 U/L — HIGH (ref 0–41)
ANION GAP SERPL CALC-SCNC: 13 MMOL/L — SIGNIFICANT CHANGE UP (ref 7–14)
AST SERPL-CCNC: 87 U/L — HIGH (ref 0–41)
BASOPHILS # BLD AUTO: 0.02 K/UL — SIGNIFICANT CHANGE UP (ref 0–0.2)
BASOPHILS NFR BLD AUTO: 0.2 % — SIGNIFICANT CHANGE UP (ref 0–1)
BILIRUB SERPL-MCNC: 0.5 MG/DL — SIGNIFICANT CHANGE UP (ref 0.2–1.2)
BUN SERPL-MCNC: 10 MG/DL — SIGNIFICANT CHANGE UP (ref 10–20)
CALCIUM SERPL-MCNC: 8.3 MG/DL — LOW (ref 8.5–10.1)
CHLORIDE SERPL-SCNC: 101 MMOL/L — SIGNIFICANT CHANGE UP (ref 98–110)
CO2 SERPL-SCNC: 25 MMOL/L — SIGNIFICANT CHANGE UP (ref 17–32)
CREAT SERPL-MCNC: 0.7 MG/DL — SIGNIFICANT CHANGE UP (ref 0.7–1.5)
EOSINOPHIL # BLD AUTO: 0.05 K/UL — SIGNIFICANT CHANGE UP (ref 0–0.7)
EOSINOPHIL NFR BLD AUTO: 0.6 % — SIGNIFICANT CHANGE UP (ref 0–8)
GLUCOSE SERPL-MCNC: 123 MG/DL — HIGH (ref 70–99)
HCT VFR BLD CALC: 42.3 % — SIGNIFICANT CHANGE UP (ref 42–52)
HGB BLD-MCNC: 14.7 G/DL — SIGNIFICANT CHANGE UP (ref 14–18)
IMM GRANULOCYTES NFR BLD AUTO: 0.6 % — HIGH (ref 0.1–0.3)
LYMPHOCYTES # BLD AUTO: 1.14 K/UL — LOW (ref 1.2–3.4)
LYMPHOCYTES # BLD AUTO: 13 % — LOW (ref 20.5–51.1)
M PNEUMO IGM SER-ACNC: 112 UNITS/ML — SIGNIFICANT CHANGE UP
MAGNESIUM SERPL-MCNC: 2.2 MG/DL — SIGNIFICANT CHANGE UP (ref 1.8–2.4)
MCHC RBC-ENTMCNC: 29.6 PG — SIGNIFICANT CHANGE UP (ref 27–31)
MCHC RBC-ENTMCNC: 34.8 G/DL — SIGNIFICANT CHANGE UP (ref 32–37)
MCV RBC AUTO: 85.1 FL — SIGNIFICANT CHANGE UP (ref 80–94)
MONOCYTES # BLD AUTO: 0.32 K/UL — SIGNIFICANT CHANGE UP (ref 0.1–0.6)
MONOCYTES NFR BLD AUTO: 3.7 % — SIGNIFICANT CHANGE UP (ref 1.7–9.3)
MYCOPLASMA AG SPEC QL: NEGATIVE — SIGNIFICANT CHANGE UP
NEUTROPHILS # BLD AUTO: 7.18 K/UL — HIGH (ref 1.4–6.5)
NEUTROPHILS NFR BLD AUTO: 81.9 % — HIGH (ref 42.2–75.2)
NRBC # BLD: 0 /100 WBCS — SIGNIFICANT CHANGE UP (ref 0–0)
PLATELET # BLD AUTO: 265 K/UL — SIGNIFICANT CHANGE UP (ref 130–400)
POTASSIUM SERPL-MCNC: 4 MMOL/L — SIGNIFICANT CHANGE UP (ref 3.5–5)
POTASSIUM SERPL-SCNC: 4 MMOL/L — SIGNIFICANT CHANGE UP (ref 3.5–5)
PROT SERPL-MCNC: 6.8 G/DL — SIGNIFICANT CHANGE UP (ref 6–8)
RBC # BLD: 4.97 M/UL — SIGNIFICANT CHANGE UP (ref 4.7–6.1)
RBC # FLD: 12 % — SIGNIFICANT CHANGE UP (ref 11.5–14.5)
SARS-COV-2 RNA SPEC QL NAA+PROBE: DETECTED
SODIUM SERPL-SCNC: 139 MMOL/L — SIGNIFICANT CHANGE UP (ref 135–146)
WBC # BLD: 8.76 K/UL — SIGNIFICANT CHANGE UP (ref 4.8–10.8)
WBC # FLD AUTO: 8.76 K/UL — SIGNIFICANT CHANGE UP (ref 4.8–10.8)

## 2020-04-01 PROCEDURE — 93010 ELECTROCARDIOGRAM REPORT: CPT

## 2020-04-01 PROCEDURE — 99233 SBSQ HOSP IP/OBS HIGH 50: CPT

## 2020-04-01 PROCEDURE — 71045 X-RAY EXAM CHEST 1 VIEW: CPT | Mod: 26

## 2020-04-01 RX ORDER — AZITHROMYCIN 500 MG/1
500 TABLET, FILM COATED ORAL ONCE
Refills: 0 | Status: COMPLETED | OUTPATIENT
Start: 2020-04-01 | End: 2020-04-01

## 2020-04-01 RX ORDER — AZITHROMYCIN 500 MG/1
250 TABLET, FILM COATED ORAL DAILY
Refills: 0 | Status: DISCONTINUED | OUTPATIENT
Start: 2020-04-02 | End: 2020-04-03

## 2020-04-01 RX ORDER — ACETAMINOPHEN 500 MG
650 TABLET ORAL ONCE
Refills: 0 | Status: COMPLETED | OUTPATIENT
Start: 2020-04-01 | End: 2020-04-01

## 2020-04-01 RX ADMIN — AZITHROMYCIN 500 MILLIGRAM(S): 500 TABLET, FILM COATED ORAL at 18:28

## 2020-04-01 RX ADMIN — ENOXAPARIN SODIUM 40 MILLIGRAM(S): 100 INJECTION SUBCUTANEOUS at 21:03

## 2020-04-01 RX ADMIN — Medication 650 MILLIGRAM(S): at 05:57

## 2020-04-01 RX ADMIN — Medication 200 MILLIGRAM(S): at 21:03

## 2020-04-01 RX ADMIN — Medication 400 MILLIGRAM(S): at 08:21

## 2020-04-01 NOTE — PROGRESS NOTE ADULT - ASSESSMENT
54 y/o M from home, walks independently, lives with family with PMH of Psoriasis and no PSH presents with fever x 15 days.  ED vitals are stable except RR 20 and saturating 93 on 2L NC.  s/p Ceftriaxone and zithromax one dose   s/p 1.9L NS   Admitted to medicine for PNA and suspicion for COVID.     Problem/Plan - 1:  ·  Problem: Respiratory failure, acute. Plan: On admission he was saturating 80% on RA and 86% on walking. Currently he is saturating 95% on nonrebreather. He is speaking in full sentences, not tachypneic.  Afebrile, no leucocytosis  Flu negative, RVP negative  CXR : Bilateral opacities are present and has Multifocal PNA.  s/p 1 dose of Ceftriaxone and Zithromax one dose in ED  s/p 1.9L in ED    f/u  COVID, RVP  f/u D dimer, Ferritin, LDH, Procalcitonin, Tcell subset  f/u Strep, Mycoplasma, Legionella  Isolation precautions  Started on .Ceftriaxone and Zithromax  c/w Oxygen Supplementation.- ddimer 364  - procal 0.11  - crp 13.94  - ferritin 2159  -   -          Problem/Plan - 2:  ·  Problem: CAP (community acquired pneumonia). Plan: Afebrile, no leucocytosis  Flu negative   CXR : Bilateral opacities are present and has Multifocal PNA.  s/p 1 dose of Ceftriaxone and Zithromax one dose in ED  s/p 1.9L in ED    f/u  COVID,    f/u Strep, Mycoplasma, Legionella  Isolation precautions        Problem/Plan - 3:  ·  Problem: Hyponatremia. Plan: Most likely due to Poor oral intake and viral illness  s/p 1.9L NS  f/u repeat bmp  f/u Urine lytes.     Problem/Plan - 4:  ·  Problem: Transaminitis.  Plan: f/u Acute Hepatitis panel.      Problem/Plan - 5:  ·  Problem: Prophylactic measure.  Plan: IMPROVE VTE Individual Risk Assessment    RISK                                                                Points  [  ] Previous VTE                                                  3  [  ] Thrombophilia                                               2  [  ] Lower limb paralysis                                      2        (unable to hold up >15 seconds)    [  ] Current Cancer                                              2         (within 6 months)  [x ] Immobilization > 24 hrs                                1  [  ] ICU/CCU stay > 24 hours                              1  [  ] Age > 60                                                      1  IMPROVE VTE Score _1__DVT ppx Lovenox 40 sub q______

## 2020-04-01 NOTE — PROGRESS NOTE ADULT - ASSESSMENT
56 y/o man with PMH of Psoriasis and not on immunosuppression presents with fever x 15 days along with chills, cough, dyspnea and myalgias. His symptoms are progressively worsening. He stated his Fever was 104F yesterday and was relieved with Tylenol.  His wife is COVID positive and she is sick.  He was transferred from [facility].    1. Acute hypoxemic respiratory failure due to COVID 19 pneumonia  pt now on NRB and is dyspneic  critical care consult  low threshold for intubation  on hydroxychloroquine   EP consult for QTc monitoring  ID consult  elevated CRP and ferritin  at risk for cytokine release syndrome  very guarded prognosis  full code status    2. Transaminitis likely due to COVID 19    3. DVT prophylaxis      PROGRESS NOTE HANDOFF    Pending: critical care consult, ID consult    Disposition: from home

## 2020-04-01 NOTE — CONSULT NOTE ADULT - SUBJECTIVE AND OBJECTIVE BOX
FARHAD FINK  55y, Male  Allergy: No Known Allergies      All historical available data reviewed.    HPI:  56 y/o M from home, walks independently, lives with family with PMH of Psoriasis and no PSH presents with fever x 15 days.    FAMILY HISTORY:    PAST MEDICAL & SURGICAL HISTORY:  Psoriasis  No significant past surgical history        VITALS:  T(F): 95.5, Max: 100.5 (04-01-20 @ 05:13)  HR: 84  BP: 117/67  RR: 18Vital Signs Last 24 Hrs  T(C): 35.3 (01 Apr 2020 10:11), Max: 38.1 (01 Apr 2020 05:13)  T(F): 95.5 (01 Apr 2020 10:11), Max: 100.5 (01 Apr 2020 05:13)  HR: 84 (01 Apr 2020 13:58) (81 - 97)  BP: 117/67 (01 Apr 2020 13:58) (117/67 - 138/83)  BP(mean): --  RR: 18 (01 Apr 2020 13:58) (18 - 18)  SpO2: 97% (01 Apr 2020 14:34) (82% - 100%)    TESTS & MEASUREMENTS:                        14.7   8.76  )-----------( 265      ( 01 Apr 2020 07:21 )             42.3     04-01    139  |  101  |  10  ----------------------------<  123<H>  4.0   |  25  |  0.7    Ca    8.3<L>      01 Apr 2020 07:21  Phos  2.4     03-31  Mg     2.2     04-01    TPro  6.8  /  Alb  3.3<L>  /  TBili  0.5  /  DBili  x   /  AST  87<H>  /  ALT  66<H>  /  AlkPhos  88  04-01    LIVER FUNCTIONS - ( 01 Apr 2020 07:21 )  Alb: 3.3 g/dL / Pro: 6.8 g/dL / ALK PHOS: 88 U/L / ALT: 66 U/L / AST: 87 U/L / GGT: x                   RADIOLOGY & ADDITIONAL TESTS:  Personal review of radiological diagnostics performed  Echo and EKG results noted when applicable.     MEDICATIONS:  chlorhexidine 4% Liquid 1 Application(s) Topical <User Schedule>  enoxaparin Injectable 40 milliGRAM(s) SubCutaneous at bedtime  hydroxychloroquine 200 milliGRAM(s) Oral every 12 hours  hydroxychloroquine   Oral       ANTIBIOTICS:  hydroxychloroquine 200 milliGRAM(s) Oral every 12 hours  hydroxychloroquine   Oral

## 2020-04-01 NOTE — PROGRESS NOTE ADULT - SUBJECTIVE AND OBJECTIVE BOX
FARHAD FINK  55y Male    INTERVAL HPI/OVERNIGHT EVENTS:    Pt seen earlier this afternoon. He is dyspneic and on NRB - pulse ox 95% this morning  He denies pain and cough.     T(F): 95.5 (04-01-20 @ 10:11), Max: 100.5 (04-01-20 @ 05:13)  HR: 84 (04-01-20 @ 13:58) (81 - 97)  BP: 117/67 (04-01-20 @ 13:58) (117/67 - 138/83)  RR: 18 (04-01-20 @ 13:58) (18 - 26)  SpO2: 97% (04-01-20 @ 14:34) (82% - 100%)    I&O's Summary    31 Mar 2020 07:01  -  01 Apr 2020 07:00  --------------------------------------------------------  IN: 0 mL / OUT: 450 mL / NET: -450 mL      PHYSICAL EXAM:  GENERAL: dyspneic with movement and speaking  HEAD:  Normocephalic  EYES:  conjunctiva and sclera clear  ENMT: NRB  NERVOUS SYSTEM:  Alert, awake  CHEST/LUNG: Coarse BS b/l  HEART: Regular rate and rhythm  ABDOMEN: Soft, Nontender, Nondistended  EXTREMITIES:   No edema      Consultant(s) Notes Reviewed:  [x ] YES  [ ] NO  Care Discussed with Consultants/Other Providers [ x] YES  [ ] NO    MEDICATIONS  (STANDING):  chlorhexidine 4% Liquid 1 Application(s) Topical <User Schedule>  enoxaparin Injectable 40 milliGRAM(s) SubCutaneous at bedtime  hydroxychloroquine 200 milliGRAM(s) Oral every 12 hours  hydroxychloroquine   Oral     MEDICATIONS  (PRN):      LABS:                        14.7   8.76  )-----------( 265      ( 01 Apr 2020 07:21 )             42.3     04-01    139  |  101  |  10  ----------------------------<  123<H>  4.0   |  25  |  0.7    Ca    8.3<L>      01 Apr 2020 07:21  Phos  2.4     03-31  Mg     2.2     04-01    TPro  6.8  /  Alb  3.3<L>  /  TBili  0.5  /  DBili  x   /  AST  87<H>  /  ALT  66<H>  /  AlkPhos  88  04-01    PT/INR - ( 31 Mar 2020 11:27 )   PT: 15.0 sec;   INR: 1.32 ratio       Procalcitonin, Serum (03.31.20 @ 17:57)    Procalcitonin, Serum: 0.11: Procalcitonin (PCT) Interpretation (ng/mL) - Diagnosis of systemic  bacterial infection/sepsis  PCT < 0.5: Systemic infection (sepsis) is not likely and risk for  progression to severe systemic infection is low. Local bacterial  infection is possible. If early sepsis is suspected clinically, PCT  should be re-assessed in 6-24 hours.  PCT >/= 0.5 but < 2.0: Systemic infection (sepsis) is possible, but other  conditions are known to elevate PCT as well. Moderate risk for  progression to severe systemic infection. The patient should be closely  monitored both clinically and by re-assessing PCT within 6-24 hours.  PCT >/= 2.0 but < 10.0: Systemic infection (sepsis) is likely, unless  other causes are known. High risk of progression to severe systemic  infection (severe sepsis/septic shock).  PCT >/= 10.0: Important systemic inflammatory response, almost  exclusively due to severe bacterial sepsis or septic shock. High  likelihood of severe sepsis or septic shock. ng/mL    C-Reactive Protein, Serum (03.31.20 @ 17:57)    C-Reactive Protein, Serum: 13.94 mg/dL    COVID-19 PCR . (03.31.20 @ 09:00)    COVID-19 PCR: Detected: This test has been validated by [employer] to be accurate;  though it has not been FDA cleared/approved by the usual pathway.  As with all laboratory tests, results should be correlated with clinical  findings.    Ferritin, Serum (03.31.20 @ 17:55)    Ferritin, Serum: 2159: Test Repeated ng/mL            RADIOLOGY & ADDITIONAL TESTS:    Imaging or report Personally Reviewed:  [x ] YES  [ ] NO    < from: Xray Chest 1 View- PORTABLE-Routine (04.01.20 @ 11:13) >  Impression:      Stable bilateral opacities     < end of copied text >      Case discussed with resident    Care discussed with pt

## 2020-04-01 NOTE — CONSULT NOTE ADULT - ASSESSMENT
56 y/o man with PMH of Psoriasis and not on immunosuppression presents with fever x 15 days along with chills, cough, dyspnea and myalgias. His symptoms are progressively worsening. He stated his Fever was 104F yesterday and was relieved with Tylenol.  His wife is COVID positive and she is sick.  He was transferred from [facility].    IMPRESSION:  COVID 19 with Hypoxemia O2 <93% and CXR with b/l opacities   -significant elevation of inflammatory markers with possible progression to cytokine storm    RECOMMENDATIONS;   PLAQUENIL 400mg PO q12h x 2 doses, then 200mg BID PO x 4 days   - Monitor QTc   - Supportive care   - Azithro 500mg PO x1, then 250mg PO daily x 4 days   -inflammatory markers

## 2020-04-01 NOTE — PROGRESS NOTE ADULT - SUBJECTIVE AND OBJECTIVE BOX
HPI  Patient is a 55y old Male who presents with a chief complaint of   Currently admitted to medicine with the primary diagnosis of    Today is hospital day 1d.     INTERVAL HPI / OVERNIGHT EVENTS:  Patient was examined and seen at bedside. This morning he is resting comfortably in bed and reports no new issues or overnight events.     ROS: Otherwise unremarkable     PAST MEDICAL & SURGICAL HISTORY  Psoriasis  No significant past surgical history    ALLERGIES  No Known Allergies    MEDICATIONS  STANDING MEDICATIONS  chlorhexidine 4% Liquid 1 Application(s) Topical <User Schedule>  enoxaparin Injectable 40 milliGRAM(s) SubCutaneous at bedtime  hydroxychloroquine 200 milliGRAM(s) Oral every 12 hours  hydroxychloroquine   Oral     PRN MEDICATIONS    VITALS:  T(F): 95.5  HR: 84  BP: 117/67  RR: 18  SpO2: 97%       LABS                        14.7   8.76  )-----------( 265      ( 01 Apr 2020 07:21 )             42.3     04-01    139  |  101  |  10  ----------------------------<  123<H>  4.0   |  25  |  0.7    Ca    8.3<L>      01 Apr 2020 07:21  Phos  2.4     03-31  Mg     2.2     04-01    TPro  6.8  /  Alb  3.3<L>  /  TBili  0.5  /  DBili  x   /  AST  87<H>  /  ALT  66<H>  /  AlkPhos  88  04-01    PT/INR - ( 31 Mar 2020 11:27 )   PT: 15.0 sec;   INR: 1.32 ratio                   CARDIAC MARKERS ( 31 Mar 2020 11:27 )  0.030 ng/mL / x     / 109 U/L / x     / x          RADIOLOGY    < from: Xray Chest 1 View- PORTABLE-Routine (04.01.20 @ 11:13) >  Impression:      Stable bilateral opacities       < end of copied text >

## 2020-04-02 LAB
ALBUMIN SERPL ELPH-MCNC: 3.2 G/DL — LOW (ref 3.5–5.2)
ALP SERPL-CCNC: 89 U/L — SIGNIFICANT CHANGE UP (ref 30–115)
ALT FLD-CCNC: 76 U/L — HIGH (ref 0–41)
ANION GAP SERPL CALC-SCNC: 15 MMOL/L — HIGH (ref 7–14)
APTT BLD: 35.7 SEC — SIGNIFICANT CHANGE UP (ref 27–39.2)
AST SERPL-CCNC: 105 U/L — HIGH (ref 0–41)
BASE EXCESS BLDA CALC-SCNC: 0.6 MMOL/L — SIGNIFICANT CHANGE UP (ref -2–2)
BASOPHILS # BLD AUTO: 0.02 K/UL — SIGNIFICANT CHANGE UP (ref 0–0.2)
BASOPHILS NFR BLD AUTO: 0.2 % — SIGNIFICANT CHANGE UP (ref 0–1)
BILIRUB SERPL-MCNC: 0.5 MG/DL — SIGNIFICANT CHANGE UP (ref 0.2–1.2)
BUN SERPL-MCNC: 9 MG/DL — LOW (ref 10–20)
CALCIUM SERPL-MCNC: 8 MG/DL — LOW (ref 8.5–10.1)
CHLORIDE SERPL-SCNC: 97 MMOL/L — LOW (ref 98–110)
CK SERPL-CCNC: 58 U/L — SIGNIFICANT CHANGE UP (ref 0–225)
CO2 SERPL-SCNC: 22 MMOL/L — SIGNIFICANT CHANGE UP (ref 17–32)
CREAT SERPL-MCNC: 0.6 MG/DL — LOW (ref 0.7–1.5)
CRP SERPL-MCNC: 24.26 MG/DL — HIGH (ref 0–0.4)
D DIMER BLD IA.RAPID-MCNC: 8078 NG/ML DDU — HIGH (ref 0–230)
EOSINOPHIL # BLD AUTO: 0.11 K/UL — SIGNIFICANT CHANGE UP (ref 0–0.7)
EOSINOPHIL NFR BLD AUTO: 1 % — SIGNIFICANT CHANGE UP (ref 0–8)
G6PD RBC-CCNC: 15.5 U/G HGB — SIGNIFICANT CHANGE UP (ref 7–20.5)
GLUCOSE BLDC GLUCOMTR-MCNC: 111 MG/DL — HIGH (ref 70–99)
GLUCOSE SERPL-MCNC: 119 MG/DL — HIGH (ref 70–99)
HCO3 BLDA-SCNC: 28 MMOL/L — SIGNIFICANT CHANGE UP (ref 21–29)
HCT VFR BLD CALC: 41.3 % — LOW (ref 42–52)
HGB BLD-MCNC: 14.7 G/DL — SIGNIFICANT CHANGE UP (ref 14–18)
IMM GRANULOCYTES NFR BLD AUTO: 0.5 % — HIGH (ref 0.1–0.3)
INR BLD: 1.33 RATIO — HIGH (ref 0.65–1.3)
LDH SERPL L TO P-CCNC: 745 U/L — HIGH (ref 50–242)
LYMPHOCYTES # BLD AUTO: 0.87 K/UL — LOW (ref 1.2–3.4)
LYMPHOCYTES # BLD AUTO: 8.1 % — LOW (ref 20.5–51.1)
MCHC RBC-ENTMCNC: 30.4 PG — SIGNIFICANT CHANGE UP (ref 27–31)
MCHC RBC-ENTMCNC: 35.6 G/DL — SIGNIFICANT CHANGE UP (ref 32–37)
MCV RBC AUTO: 85.5 FL — SIGNIFICANT CHANGE UP (ref 80–94)
MONOCYTES # BLD AUTO: 0.28 K/UL — SIGNIFICANT CHANGE UP (ref 0.1–0.6)
MONOCYTES NFR BLD AUTO: 2.6 % — SIGNIFICANT CHANGE UP (ref 1.7–9.3)
NEUTROPHILS # BLD AUTO: 9.46 K/UL — HIGH (ref 1.4–6.5)
NEUTROPHILS NFR BLD AUTO: 87.6 % — HIGH (ref 42.2–75.2)
NRBC # BLD: 0 /100 WBCS — SIGNIFICANT CHANGE UP (ref 0–0)
NT-PROBNP SERPL-SCNC: 66 PG/ML — SIGNIFICANT CHANGE UP (ref 0–300)
PCO2 BLDA: 51 MMHG — HIGH (ref 38–42)
PH BLDA: 7.34 — LOW (ref 7.38–7.42)
PLATELET # BLD AUTO: 294 K/UL — SIGNIFICANT CHANGE UP (ref 130–400)
PO2 BLDA: 63 MMHG — LOW (ref 78–95)
POTASSIUM SERPL-MCNC: 4.1 MMOL/L — SIGNIFICANT CHANGE UP (ref 3.5–5)
POTASSIUM SERPL-SCNC: 4.1 MMOL/L — SIGNIFICANT CHANGE UP (ref 3.5–5)
PROCALCITONIN SERPL-MCNC: 0.21 NG/ML — HIGH (ref 0.02–0.1)
PROT SERPL-MCNC: 6.4 G/DL — SIGNIFICANT CHANGE UP (ref 6–8)
PROTHROM AB SERPL-ACNC: 15.3 SEC — HIGH (ref 9.95–12.87)
RBC # BLD: 4.83 M/UL — SIGNIFICANT CHANGE UP (ref 4.7–6.1)
RBC # FLD: 11.9 % — SIGNIFICANT CHANGE UP (ref 11.5–14.5)
SAO2 % BLDA: 91 % — LOW (ref 92–96)
SODIUM SERPL-SCNC: 134 MMOL/L — LOW (ref 135–146)
TROPONIN T SERPL-MCNC: <0.01 NG/ML — SIGNIFICANT CHANGE UP
WBC # BLD: 10.79 K/UL — SIGNIFICANT CHANGE UP (ref 4.8–10.8)
WBC # FLD AUTO: 10.79 K/UL — SIGNIFICANT CHANGE UP (ref 4.8–10.8)

## 2020-04-02 PROCEDURE — 36556 INSERT NON-TUNNEL CV CATH: CPT

## 2020-04-02 PROCEDURE — 71045 X-RAY EXAM CHEST 1 VIEW: CPT | Mod: 26

## 2020-04-02 PROCEDURE — 71045 X-RAY EXAM CHEST 1 VIEW: CPT | Mod: 26,77

## 2020-04-02 RX ORDER — PANTOPRAZOLE SODIUM 20 MG/1
40 TABLET, DELAYED RELEASE ORAL DAILY
Refills: 0 | Status: DISCONTINUED | OUTPATIENT
Start: 2020-04-02 | End: 2020-04-10

## 2020-04-02 RX ORDER — MIDAZOLAM HYDROCHLORIDE 1 MG/ML
0.15 INJECTION, SOLUTION INTRAMUSCULAR; INTRAVENOUS
Qty: 100 | Refills: 0 | Status: DISCONTINUED | OUTPATIENT
Start: 2020-04-02 | End: 2020-04-02

## 2020-04-02 RX ORDER — MORPHINE SULFATE 50 MG/1
2 CAPSULE, EXTENDED RELEASE ORAL
Qty: 100 | Refills: 0 | Status: DISCONTINUED | OUTPATIENT
Start: 2020-04-02 | End: 2020-04-02

## 2020-04-02 RX ORDER — PROPOFOL 10 MG/ML
27.34 INJECTION, EMULSION INTRAVENOUS
Qty: 1000 | Refills: 0 | Status: DISCONTINUED | OUTPATIENT
Start: 2020-04-02 | End: 2020-04-06

## 2020-04-02 RX ORDER — MORPHINE SULFATE 50 MG/1
10 CAPSULE, EXTENDED RELEASE ORAL
Qty: 100 | Refills: 0 | Status: DISCONTINUED | OUTPATIENT
Start: 2020-04-02 | End: 2020-04-03

## 2020-04-02 RX ORDER — MORPHINE SULFATE 50 MG/1
4 CAPSULE, EXTENDED RELEASE ORAL ONCE
Refills: 0 | Status: DISCONTINUED | OUTPATIENT
Start: 2020-04-02 | End: 2020-04-02

## 2020-04-02 RX ORDER — CEFTRIAXONE 500 MG/1
1000 INJECTION, POWDER, FOR SOLUTION INTRAMUSCULAR; INTRAVENOUS EVERY 24 HOURS
Refills: 0 | Status: COMPLETED | OUTPATIENT
Start: 2020-04-02 | End: 2020-04-06

## 2020-04-02 RX ORDER — PROPOFOL 10 MG/ML
20 INJECTION, EMULSION INTRAVENOUS
Qty: 1000 | Refills: 0 | Status: DISCONTINUED | OUTPATIENT
Start: 2020-04-02 | End: 2020-04-02

## 2020-04-02 RX ORDER — MORPHINE SULFATE 50 MG/1
4 CAPSULE, EXTENDED RELEASE ORAL ONCE
Refills: 0 | Status: DISCONTINUED | OUTPATIENT
Start: 2020-04-02 | End: 2020-04-03

## 2020-04-02 RX ORDER — NOREPINEPHRINE BITARTRATE/D5W 8 MG/250ML
0.05 PLASTIC BAG, INJECTION (ML) INTRAVENOUS
Qty: 8 | Refills: 0 | Status: DISCONTINUED | OUTPATIENT
Start: 2020-04-02 | End: 2020-04-05

## 2020-04-02 RX ORDER — PROPOFOL 10 MG/ML
20.51 INJECTION, EMULSION INTRAVENOUS
Qty: 500 | Refills: 0 | Status: DISCONTINUED | OUTPATIENT
Start: 2020-04-02 | End: 2020-04-02

## 2020-04-02 RX ADMIN — CEFTRIAXONE 100 MILLIGRAM(S): 500 INJECTION, POWDER, FOR SOLUTION INTRAMUSCULAR; INTRAVENOUS at 19:26

## 2020-04-02 RX ADMIN — MORPHINE SULFATE 4 MILLIGRAM(S): 50 CAPSULE, EXTENDED RELEASE ORAL at 12:06

## 2020-04-02 RX ADMIN — PROPOFOL 16 MICROGRAM(S)/KG/MIN: 10 INJECTION, EMULSION INTRAVENOUS at 18:58

## 2020-04-02 RX ADMIN — MORPHINE SULFATE 10 MG/HR: 50 CAPSULE, EXTENDED RELEASE ORAL at 18:55

## 2020-04-02 RX ADMIN — Medication 200 MILLIGRAM(S): at 08:12

## 2020-04-02 RX ADMIN — Medication 4 MILLIGRAM(S): at 12:05

## 2020-04-02 RX ADMIN — AZITHROMYCIN 250 MILLIGRAM(S): 500 TABLET, FILM COATED ORAL at 21:57

## 2020-04-02 RX ADMIN — Medication 200 MILLIGRAM(S): at 21:56

## 2020-04-02 NOTE — CONSULT NOTE ADULT - ATTENDING COMMENTS
patient seen and examined, agree with above, AHRF/ ARDS/ COVID worsening status s/p intubation, MICU, poor prognosis

## 2020-04-02 NOTE — CONSULT NOTE ADULT - ASSESSMENT
Assessment:    Acute hypoxemic respiratory failure secondary to SARS-COV-2 infection requiring invasive mechanical ventilation  Bilateral pulmonary Infiltrates secondary to SARS-COV-2  ARDS    PLAN:    CNS: Sedation with  propofol to RASS of -2, morphine for pain    HEENT:  Oral care    PULMONARY:  HOB @ 45 degrees,  trend procalcitonin, LDH/CK/ferritin/CRP  Start solumedrol 60q12(monitor FS), Vent setting Vt= 6/PBW      Peep=10     Ulj6=258     RR=18  , monitor Plateau and Driving pressure. Checj CXR post-intubation, ABGs in 30 mins after intubation.             CARDIOVASCULAR: trend trops, NT-probnp, Check QTC.     GI: GI prophylaxis Protonix                                         Feeding: Tube feeds    RENAL:  F/u  lytes.  Correct as needed. accurate I/O    INFECTIOUS DISEASE: IV abx  ceftriaxone/azithro, Continue HCQ(monitor Qtc),  trend procalcitonin, LDH/CK/ferritin/CRP Check nasal MRSA,urine legionella, urine strept. ID f/u     HEMATOLOGICAL:  DVT prophylaxis.    ENDOCRINE:  Follow up FS.  Insulin protocol if needed.    MUSCULOSKELETAL: Bed rest for now    CODE STATUS: FULL CODE    DISPOSITION: Patient require continued monitoring in MICU Assessment:    Acute hypoxemic respiratory failure secondary to SARS-COV-2 infection requiring invasive mechanical ventilation  Bilateral pulmonary Infiltrates secondary to SARS-COV-2  ARDS    PLAN:    CNS: Sedation with  propofol to RASS of -2, morphine for pain    HEENT:  Oral care    PULMONARY:  HOB @ 45 degrees,  trend procalcitonin, LDH/CK/ferritin/CRP  Vent setting Vt= 6/PBW      Peep=15    Myu9=623     RR=18  , monitor Plateau and Driving pressure. Check CXR post-intubation, ABGs in 30 mins after intubation.             CARDIOVASCULAR: trend trops, NT-probnp, Check QTC.     GI: GI prophylaxis Protonix                                         Feeding: Tube feeds    RENAL:  F/u  lytes.  Correct as needed. accurate I/O    INFECTIOUS DISEASE: IV abx  ceftriaxone,  Continue HCQ(monitor Qtc),  trend procalcitonin, LDH/CK/ferritin/CRP  ID f/u     HEMATOLOGICAL:  DVT prophylaxis.    ENDOCRINE:  Follow up FS.  Insulin protocol if needed.    MUSCULOSKELETAL: Bed rest for now    CODE STATUS: FULL CODE    DISPOSITION: Patient require continued monitoring in MICU  very poor prognosis

## 2020-04-02 NOTE — CHART NOTE - NSCHARTNOTEFT_GEN_A_CORE
54 yo M with history of psoriasis not on immunosuppression presents with COVID 19 transferred from [place] desaturating on NRB into 80s, tachypneic, decision made to intubate. Patient agreeable, family informed. TLC placed. Today is day 17 since he developed symptoms.    ASSESSMENTS  AHRF 2/2 COVID19 s/p intubation  HO psoriasis not on immunosuppression    PLANS  -check ABG after intubation  -continue HCQ and azithromycin, monitor QTc  -send COVID 19 immunology panel 56 yo M with history of psoriasis not on immunosuppression presents with COVID 19 transferred from [place] desaturating on NRB into 80s, tachypneic, decision made to intubate. Patient agreeable, family informed. TLC placed. Today is day 17 since he developed symptoms.    ASSESSMENTS  AHRF 2/2 COVID19 s/p intubation  HO psoriasis not on immunosuppression    PLANS  -continue HCQ and azithromycin, monitor QTc  -add Rocephin  -f/u inflammatory markers

## 2020-04-02 NOTE — AIRWAY PLACEMENT NOTE ADULT - POST AIRWAY PLACEMENT ASSESSMENT:
breath sounds bilateral/positive end tidal CO2 noted/chest excursion noted/breath sounds equal/skin color improved

## 2020-04-02 NOTE — CONSULT NOTE ADULT - SUBJECTIVE AND OBJECTIVE BOX
Patient is a 55y old  Male who presents with a chief complaint of covid (01 Apr 2020 15:17)      HPI: 56 yo male with PMH of psoriasis comes with chiwefc/o fever/cough for 1-2 weeks, a/w chills,  cough, SOB. Known COVID+ family contact.  Transferred from [place] for bed convenience. Patient SOB was getting worse on NRM, tachypneic, Anesthesia at the bedside to intubate the patient.      PAST MEDICAL & SURGICAL HISTORY:  Psoriasis  No significant past surgical history      SOCIAL HX:   Smoking    -ve                     ETOH        -ve                    Other  -ve    FAMILY HISTORY:  :  No known cardiovacular family hisotry     ROS:  See HPI     Allergies    No Known Allergies    Intolerances          PHYSICAL EXAM    ICU Vital Signs Last 24 Hrs  T(C): 37 (02 Apr 2020 05:00), Max: 37.4 (02 Apr 2020 04:00)  T(F): 98.6 (02 Apr 2020 05:00), Max: 99.4 (02 Apr 2020 04:00)  HR: 90 (02 Apr 2020 10:16) (84 - 93)  BP: 123/75 (02 Apr 2020 10:16) (111/69 - 123/75)  RR: 35 (02 Apr 2020 10:16) (18 - 35)  SpO2: 94% on 100% NRM (02 Apr 2020 10:16) (85% - 100%)      General: Tachypneic, using accessory ms.    HEENT:  FRANK              Lymphatic system: No cervical LN   Lungs: Bilateral crackles  Cardiovascular: Regular  Gastrointestinal: Soft, Positive BS  Musculoskeletal: no LE edema   Skin: Warm.  Intact. psoriasis B/l hands  Neurological: No motor or sensory deficit         LABS:                          14.7   10.79 )-----------( 294      ( 02 Apr 2020 04:30 )             41.3                                               04-02    134<L>  |  97<L>  |  9<L>  ----------------------------<  119<H>  4.1   |  22  |  0.6<L>    Ca    8.0<L>      02 Apr 2020 04:30  Mg     2.2     04-01    TPro  6.4  /  Alb  3.2<L>  /  TBili  0.5  /  DBili  x   /  AST  105<H>  /  ALT  76<H>  /  AlkPhos  89  04-02                                                                                           LIVER FUNCTIONS - ( 02 Apr 2020 04:30 )  Alb: 3.2 g/dL / Pro: 6.4 g/dL / ALK PHOS: 89 U/L / ALT: 76 U/L / AST: 105 U/L / GGT: x                                                                                                                                       X-Rays                                                                                     ECHO    MEDICATIONS  (STANDING):  azithromycin   Tablet 250 milliGRAM(s) Oral daily  chlorhexidine 4% Liquid 1 Application(s) Topical <User Schedule>  enoxaparin Injectable 40 milliGRAM(s) SubCutaneous at bedtime  hydroxychloroquine 200 milliGRAM(s) Oral every 12 hours  hydroxychloroquine   Oral   LORazepam   Injectable 4 milliGRAM(s) IV Push once  midazolam Infusion 0.15 mG/kG/Hr (14.6 mL/Hr) IV Continuous <Continuous>  morphine  - Injectable 4 milliGRAM(s) IV Push once  morphine  - Injectable 4 milliGRAM(s) IV Push once  morphine  Infusion 2 mG/Hr (2 mL/Hr) IV Continuous <Continuous>  norepinephrine Infusion 0.05 MICROgram(s)/kG/Min (9.14 mL/Hr) IV Continuous <Continuous>  propofol Infusion 20 MICROgram(s)/kG/Min (11.7 mL/Hr) IV Continuous <Continuous>    MEDICATIONS  (PRN): Patient is a 55y old  Male who presents with a chief complaint of SOB (01 Apr 2020 15:17)      HPI: 56 yo male with PMH of psoriasis comes c/o fever/cough for 1-2 weeks, a/w chills,  cough, SOB. Known COVID+ family contact.  Transferred from [place] for bed convenience. Patient SOB was getting worse on NRM, tachypneic, Anesthesia at the bedside to intubate the patient. called for MICU, intubated, sedated      PAST MEDICAL & SURGICAL HISTORY:  Psoriasis  No significant past surgical history      SOCIAL HX:   Smoking    -ve                     ETOH        -ve                    Other  -ve    FAMILY HISTORY:  :  No known cardiovacular family hisotry     ROS:  See HPI     Allergies    No Known Allergies    Intolerances          PHYSICAL EXAM    ICU Vital Signs Last 24 Hrs  T(C): 37 (02 Apr 2020 05:00), Max: 37.4 (02 Apr 2020 04:00)  T(F): 98.6 (02 Apr 2020 05:00), Max: 99.4 (02 Apr 2020 04:00)  HR: 90 (02 Apr 2020 10:16) (84 - 93)  BP: 123/75 (02 Apr 2020 10:16) (111/69 - 123/75)  RR: 35 (02 Apr 2020 10:16) (18 - 35)  SpO2: 94% on 100% NRM (02 Apr 2020 10:16) (85% - 100%)      General: Tachypneic, using accessory ms.    HEENT:  FRANK              Lymphatic system: No cervical LN   Lungs: Bilateral crackles  Cardiovascular: Regular  Gastrointestinal: Soft, Positive BS  Musculoskeletal: no LE edema   Skin: Warm.  Intact. psoriasis B/l hands  Neurological: No motor or sensory deficit         LABS:                          14.7   10.79 )-----------( 294      ( 02 Apr 2020 04:30 )             41.3                                               04-02    134<L>  |  97<L>  |  9<L>  ----------------------------<  119<H>  4.1   |  22  |  0.6<L>    Ca    8.0<L>      02 Apr 2020 04:30  Mg     2.2     04-01    TPro  6.4  /  Alb  3.2<L>  /  TBili  0.5  /  DBili  x   /  AST  105<H>  /  ALT  76<H>  /  AlkPhos  89  04-02                                                                                           LIVER FUNCTIONS - ( 02 Apr 2020 04:30 )  Alb: 3.2 g/dL / Pro: 6.4 g/dL / ALK PHOS: 89 U/L / ALT: 76 U/L / AST: 105 U/L / GGT: x                                                                                                                                       X-Rays  reviewed    MEDICATIONS  (STANDING):  azithromycin   Tablet 250 milliGRAM(s) Oral daily  chlorhexidine 4% Liquid 1 Application(s) Topical <User Schedule>  enoxaparin Injectable 40 milliGRAM(s) SubCutaneous at bedtime  hydroxychloroquine 200 milliGRAM(s) Oral every 12 hours  hydroxychloroquine   Oral   LORazepam   Injectable 4 milliGRAM(s) IV Push once  midazolam Infusion 0.15 mG/kG/Hr (14.6 mL/Hr) IV Continuous <Continuous>  morphine  - Injectable 4 milliGRAM(s) IV Push once  morphine  - Injectable 4 milliGRAM(s) IV Push once  morphine  Infusion 2 mG/Hr (2 mL/Hr) IV Continuous <Continuous>  norepinephrine Infusion 0.05 MICROgram(s)/kG/Min (9.14 mL/Hr) IV Continuous <Continuous>  propofol Infusion 20 MICROgram(s)/kG/Min (11.7 mL/Hr) IV Continuous <Continuous>    MEDICATIONS  (PRN):

## 2020-04-03 LAB
ALBUMIN SERPL ELPH-MCNC: 2.9 G/DL — LOW (ref 3.5–5.2)
ALP SERPL-CCNC: 98 U/L — SIGNIFICANT CHANGE UP (ref 30–115)
ALT FLD-CCNC: 89 U/L — HIGH (ref 0–41)
ANION GAP SERPL CALC-SCNC: 12 MMOL/L — SIGNIFICANT CHANGE UP (ref 7–14)
ANION GAP SERPL CALC-SCNC: 13 MMOL/L — SIGNIFICANT CHANGE UP (ref 7–14)
AST SERPL-CCNC: 86 U/L — HIGH (ref 0–41)
BASOPHILS # BLD AUTO: 0.02 K/UL — SIGNIFICANT CHANGE UP (ref 0–0.2)
BASOPHILS NFR BLD AUTO: 0.2 % — SIGNIFICANT CHANGE UP (ref 0–1)
BILIRUB SERPL-MCNC: 0.8 MG/DL — SIGNIFICANT CHANGE UP (ref 0.2–1.2)
BUN SERPL-MCNC: 25 MG/DL — HIGH (ref 10–20)
BUN SERPL-MCNC: 38 MG/DL — HIGH (ref 10–20)
CALCIUM SERPL-MCNC: 8.2 MG/DL — LOW (ref 8.5–10.1)
CALCIUM SERPL-MCNC: 8.3 MG/DL — LOW (ref 8.5–10.1)
CHLORIDE SERPL-SCNC: 97 MMOL/L — LOW (ref 98–110)
CHLORIDE SERPL-SCNC: 99 MMOL/L — SIGNIFICANT CHANGE UP (ref 98–110)
CO2 SERPL-SCNC: 26 MMOL/L — SIGNIFICANT CHANGE UP (ref 17–32)
CO2 SERPL-SCNC: 27 MMOL/L — SIGNIFICANT CHANGE UP (ref 17–32)
CREAT SERPL-MCNC: 1.1 MG/DL — SIGNIFICANT CHANGE UP (ref 0.7–1.5)
CREAT SERPL-MCNC: 1.2 MG/DL — SIGNIFICANT CHANGE UP (ref 0.7–1.5)
CRP SERPL-MCNC: 30.51 MG/DL — HIGH (ref 0–0.4)
EOSINOPHIL # BLD AUTO: 0.22 K/UL — SIGNIFICANT CHANGE UP (ref 0–0.7)
EOSINOPHIL NFR BLD AUTO: 2 % — SIGNIFICANT CHANGE UP (ref 0–8)
FERRITIN SERPL-MCNC: 2790 NG/ML — HIGH (ref 30–400)
GLUCOSE SERPL-MCNC: 100 MG/DL — HIGH (ref 70–99)
GLUCOSE SERPL-MCNC: 281 MG/DL — HIGH (ref 70–99)
HCT VFR BLD CALC: 41.1 % — LOW (ref 42–52)
HGB BLD-MCNC: 13.7 G/DL — LOW (ref 14–18)
IMM GRANULOCYTES NFR BLD AUTO: 0.5 % — HIGH (ref 0.1–0.3)
LYMPHOCYTES # BLD AUTO: 0.8 K/UL — LOW (ref 1.2–3.4)
LYMPHOCYTES # BLD AUTO: 7.1 % — LOW (ref 20.5–51.1)
MAGNESIUM SERPL-MCNC: 2.5 MG/DL — HIGH (ref 1.8–2.4)
MAGNESIUM SERPL-MCNC: 2.9 MG/DL — HIGH (ref 1.8–2.4)
MCHC RBC-ENTMCNC: 29 PG — SIGNIFICANT CHANGE UP (ref 27–31)
MCHC RBC-ENTMCNC: 33.3 G/DL — SIGNIFICANT CHANGE UP (ref 32–37)
MCV RBC AUTO: 86.9 FL — SIGNIFICANT CHANGE UP (ref 80–94)
MONOCYTES # BLD AUTO: 0.36 K/UL — SIGNIFICANT CHANGE UP (ref 0.1–0.6)
MONOCYTES NFR BLD AUTO: 3.2 % — SIGNIFICANT CHANGE UP (ref 1.7–9.3)
NEUTROPHILS # BLD AUTO: 9.75 K/UL — HIGH (ref 1.4–6.5)
NEUTROPHILS NFR BLD AUTO: 87 % — HIGH (ref 42.2–75.2)
NRBC # BLD: 0 /100 WBCS — SIGNIFICANT CHANGE UP (ref 0–0)
PLATELET # BLD AUTO: 305 K/UL — SIGNIFICANT CHANGE UP (ref 130–400)
POTASSIUM SERPL-MCNC: 4.5 MMOL/L — SIGNIFICANT CHANGE UP (ref 3.5–5)
POTASSIUM SERPL-MCNC: 5.1 MMOL/L — HIGH (ref 3.5–5)
POTASSIUM SERPL-SCNC: 4.5 MMOL/L — SIGNIFICANT CHANGE UP (ref 3.5–5)
POTASSIUM SERPL-SCNC: 5.1 MMOL/L — HIGH (ref 3.5–5)
PROCALCITONIN SERPL-MCNC: 0.38 NG/ML — HIGH (ref 0.02–0.1)
PROT SERPL-MCNC: 6.6 G/DL — SIGNIFICANT CHANGE UP (ref 6–8)
RBC # BLD: 4.73 M/UL — SIGNIFICANT CHANGE UP (ref 4.7–6.1)
RBC # FLD: 12 % — SIGNIFICANT CHANGE UP (ref 11.5–14.5)
SODIUM SERPL-SCNC: 137 MMOL/L — SIGNIFICANT CHANGE UP (ref 135–146)
SODIUM SERPL-SCNC: 137 MMOL/L — SIGNIFICANT CHANGE UP (ref 135–146)
WBC # BLD: 11.21 K/UL — HIGH (ref 4.8–10.8)
WBC # FLD AUTO: 11.21 K/UL — HIGH (ref 4.8–10.8)

## 2020-04-03 PROCEDURE — 71045 X-RAY EXAM CHEST 1 VIEW: CPT | Mod: 26

## 2020-04-03 RX ORDER — SENNA PLUS 8.6 MG/1
2 TABLET ORAL AT BEDTIME
Refills: 0 | Status: DISCONTINUED | OUTPATIENT
Start: 2020-04-03 | End: 2020-04-12

## 2020-04-03 RX ORDER — ACETAMINOPHEN 500 MG
650 TABLET ORAL EVERY 6 HOURS
Refills: 0 | Status: DISCONTINUED | OUTPATIENT
Start: 2020-04-03 | End: 2020-04-12

## 2020-04-03 RX ORDER — SODIUM CHLORIDE 9 MG/ML
500 INJECTION, SOLUTION INTRAVENOUS ONCE
Refills: 0 | Status: COMPLETED | OUTPATIENT
Start: 2020-04-03 | End: 2020-04-03

## 2020-04-03 RX ORDER — MORPHINE SULFATE 50 MG/1
1 CAPSULE, EXTENDED RELEASE ORAL
Qty: 100 | Refills: 0 | Status: DISCONTINUED | OUTPATIENT
Start: 2020-04-03 | End: 2020-04-06

## 2020-04-03 RX ORDER — LACTULOSE 10 G/15ML
15 SOLUTION ORAL DAILY
Refills: 0 | Status: DISCONTINUED | OUTPATIENT
Start: 2020-04-03 | End: 2020-04-12

## 2020-04-03 RX ORDER — SODIUM CHLORIDE 9 MG/ML
1000 INJECTION, SOLUTION INTRAVENOUS
Refills: 0 | Status: DISCONTINUED | OUTPATIENT
Start: 2020-04-03 | End: 2020-04-04

## 2020-04-03 RX ADMIN — MORPHINE SULFATE 10 MG/HR: 50 CAPSULE, EXTENDED RELEASE ORAL at 15:11

## 2020-04-03 RX ADMIN — Medication 200 MILLIGRAM(S): at 23:29

## 2020-04-03 RX ADMIN — MORPHINE SULFATE 1 MG/HR: 50 CAPSULE, EXTENDED RELEASE ORAL at 17:34

## 2020-04-03 RX ADMIN — CEFTRIAXONE 100 MILLIGRAM(S): 500 INJECTION, POWDER, FOR SOLUTION INTRAMUSCULAR; INTRAVENOUS at 17:32

## 2020-04-03 RX ADMIN — Medication 60 MILLIGRAM(S): at 17:33

## 2020-04-03 RX ADMIN — SODIUM CHLORIDE 3000 MILLILITER(S): 9 INJECTION, SOLUTION INTRAVENOUS at 13:25

## 2020-04-03 RX ADMIN — SODIUM CHLORIDE 60 MILLILITER(S): 9 INJECTION, SOLUTION INTRAVENOUS at 22:00

## 2020-04-03 RX ADMIN — SODIUM CHLORIDE 60 MILLILITER(S): 9 INJECTION, SOLUTION INTRAVENOUS at 13:25

## 2020-04-03 RX ADMIN — Medication 60 MILLIGRAM(S): at 13:18

## 2020-04-03 RX ADMIN — Medication 650 MILLIGRAM(S): at 00:35

## 2020-04-03 RX ADMIN — Medication 200 MILLIGRAM(S): at 13:18

## 2020-04-03 RX ADMIN — MORPHINE SULFATE 10 MG/HR: 50 CAPSULE, EXTENDED RELEASE ORAL at 06:15

## 2020-04-03 RX ADMIN — SENNA PLUS 2 TABLET(S): 8.6 TABLET ORAL at 23:29

## 2020-04-03 RX ADMIN — LACTULOSE 15 GRAM(S): 10 SOLUTION ORAL at 13:18

## 2020-04-03 RX ADMIN — ENOXAPARIN SODIUM 40 MILLIGRAM(S): 100 INJECTION SUBCUTANEOUS at 00:35

## 2020-04-03 RX ADMIN — PROPOFOL 16 MICROGRAM(S)/KG/MIN: 10 INJECTION, EMULSION INTRAVENOUS at 21:00

## 2020-04-03 RX ADMIN — Medication 650 MILLIGRAM(S): at 17:25

## 2020-04-03 RX ADMIN — CHLORHEXIDINE GLUCONATE 1 APPLICATION(S): 213 SOLUTION TOPICAL at 05:26

## 2020-04-03 RX ADMIN — PROPOFOL 16 MICROGRAM(S)/KG/MIN: 10 INJECTION, EMULSION INTRAVENOUS at 06:16

## 2020-04-03 RX ADMIN — PANTOPRAZOLE SODIUM 40 MILLIGRAM(S): 20 TABLET, DELAYED RELEASE ORAL at 13:19

## 2020-04-03 NOTE — DIETITIAN INITIAL EVALUATION ADULT. - ENERGY NEEDS
CALORIE: ~1250 - 1450 kcals/day   1372 - 1600 kcals (60-70% [id number]) vs. 1238 - 1408 kcals (22-25 kcals/kg IBW) vs. 1073 - 1365 kcals (11-14 kcals/kg CBW)  PROTEIN: 84 - 96 gms/day (1.5 - 1.7 gm/kg IBW)  FLUID: per LIP

## 2020-04-03 NOTE — PROGRESS NOTE ADULT - ASSESSMENT
56 y/o man with PMH of Psoriasis and not on immunosuppression presents with fever x 15 days along with chills, cough, dyspnea and myalgias. His symptoms are progressively worsening. He stated his Fever was 104F yesterday and was relieved with Tylenol.  His wife is COVID positive and she is sick.  He was transferred from [facility].    IMPRESSION:  Severe sepsis  with SIRS and lactic acidosis due to COVID-19 Pneumonia  Pt with Hypoxemia O2 <93% and CXR with b/l opacities   -significant elevation of inflammatory markers with possible progression to cytokine storm    - T(F): , Max: 102.2   - WBC Count: 11.21 K/uL    On norepinephrine Infusion 0.05  azithromycin   Tablet 250 milliGRAM(s) Oral daily  cefTRIAXone   IVPB 1000 milliGRAM(s) IV Intermittent every 24 hours  hydroxychloroquine 200 milliGRAM(s) Oral every 12 hours  4/2 QTC Calculation(Bezet) 427 ms  Cr 1.4  Procalcitonin 0.21    RECOMMENDATIONS;  Complete 8 total doses of hydroxychloroquine 200mg q12h PO:  Monitor QTC daily   Continue Azithro & IV Rocephin

## 2020-04-03 NOTE — PROGRESS NOTE ADULT - SUBJECTIVE AND OBJECTIVE BOX
FARHAD FINK  55y, Male  Allergy: No Known Allergies      CHIEF COMPLAINT: SOB/NRm/Covid (02 Apr 2020 12:49)      INTERVAL EVENTS/HPI  - No acute events overnight  - T(F): , Max: 102.2 (04-03-20 @ 00:14)  - Denies any worsening symptoms  - Tolerating medication  - WBC Count: 11.21 K/uL (04-03-20 @ 05:00)      ROS  unable to obtain history secondary to patient's mental status and/or sedation     FH non-contributory   Social Hx non-contributory    VITALS:  T(F): 100.1, Max: 102.2 (04-03-20 @ 00:14)  HR: 101  BP: 103/61  RR: 20Vital Signs Last 24 Hrs  T(C): 37.8 (03 Apr 2020 02:25), Max: 39 (03 Apr 2020 00:14)  T(F): 100.1 (03 Apr 2020 02:25), Max: 102.2 (03 Apr 2020 00:14)  HR: 101 (03 Apr 2020 08:13) (85 - 101)  BP: 103/61 (03 Apr 2020 08:13) (88/58 - 109/69)  BP(mean): 75 (03 Apr 2020 08:13) (68 - 83)  RR: 20 (03 Apr 2020 08:13) (20 - 22)  SpO2: 95% (03 Apr 2020 04:00) (94% - 95%)    PHYSICAL EXAM:  see below       TESTS & MEASUREMENTS:                        13.7   11.21 )-----------( 305      ( 03 Apr 2020 05:00 )             41.1     04-03    137  |  97<L>  |  25<H>  ----------------------------<  100<H>  4.5   |  27  |  1.1    Ca    8.2<L>      03 Apr 2020 05:00  Mg     2.5     04-03    TPro  6.6  /  Alb  2.9<L>  /  TBili  0.8  /  DBili  x   /  AST  86<H>  /  ALT  89<H>  /  AlkPhos  98  04-03    eGFR if Non African American: 74 mL/min/1.73M2 (04-03-20 @ 05:00)  eGFR if African American: 86 mL/min/1.73M2 (04-03-20 @ 05:00)    LIVER FUNCTIONS - ( 03 Apr 2020 05:00 )  Alb: 2.9 g/dL / Pro: 6.6 g/dL / ALK PHOS: 98 U/L / ALT: 89 U/L / AST: 86 U/L / GGT: x                 Lactate, Blood: 1.4 mmol/L (03-31-20 @ 11:27)      INFECTIOUS DISEASES TESTING      RADIOLOGY & ADDITIONAL TESTS:  I have personally reviewed the last Chest xray  CXR  Xray Chest 1 View- PORTABLE-Urgent:   EXAM:  XR CHEST PORTABLE URGENT 1V            PROCEDURE DATE:  04/02/2020            INTERPRETATION:  Clinical History/Reason for Exam: Respiratory failure    Comparison: XR CHEST 4/2/2020 2:38 PM.      Findings:    Technique/Positioning:  Frontal portable radiograph of the chest.    Support devices: Endotracheal tube with its tip at the level of the aortic arch. Left IJ catheter with its tip in the superior cava atrial junction. Enteric support tube with its tip in the stomach.     Cardiac/mediastinum/hilum: Stable    Lung parenchyma/ Pleura: Stable bilateral parenchymal opacities. No  pneumothorax    Skeleton/soft tissues: Stable      Impression:    Stable bilateral parenchymal opacities    Support tubes and catheters as above.                            JABARI RIVERA M.D., ATTENDING RADIOLOGIST  This document has been electronically signed. Apr 2 2020  9:32PM             (04-02-20 @ 21:27)  Xray Chest 1 View- PORTABLE-Urgent:   EXAM:  XR CHEST PORTABLE URGENT 1V            PROCEDURE DATE:  04/02/2020            INTERPRETATION:  CLINICAL HISTORY / REASON FOR EXAM: Pneumonia. COVID-19.    COMPARISON: Chest radiograph from April 1, 2020    TECHNIQUE/POSITIONING: Satisfactory. Single image, AP portable chest radiograph.    FINDINGS:    SUPPORT DEVICES: None.    CARDIAC/MEDIASTINUM/HILUM: Unchanged cardiac silhouette.    LUNG PARENCHYMA/PLEURA: Slightly increased bilateral diffuse opacities, right greater than left. No pneumothorax.    SKELETON/SOFT TISSUES: Unchanged.      IMPRESSION:      Slightly increased bilateral diffuse opacities, right greater than left.              RADHA DC M.D., RESIDENT RADIOLOGIST  This document has been electronically signed.  JAZZ WATERMAN M.D., ATTENDING RADIOLOGIST  This document has been electronically signed. Apr 2 2020 11:17AM             (04-02-20 @ 10:43)      CT      CARDIOLOGY TESTING  12 Lead ECG:   Ventricular Rate 84 BPM    Atrial Rate 84 BPM    P-R Interval 166 ms    QRS Duration 116 ms    Q-T Interval 362 ms    QTC Calculation(Bezet) 427 ms    P Axis 39 degrees    R Axis -29 degrees    T Axis 12 degrees    Diagnosis Line Normal sinus rhythm  Left axis deviation  Borderline EKG      Confirmed by ISIDORO ROSEN MD ([id number]) on 4/2/2020 10:59:02 PM (04-01-20 @ 11:22)  12 Lead ECG:   Ventricular Rate 98 BPM    Atrial Rate 98 BPM    P-R Interval 152 ms    QRS Duration 108 ms    Q-T Interval 344 ms    QTC Calculation(Bezet) 439 ms    P Axis 35 degrees    R Axis -42 degrees    T Axis 22 degrees    Diagnosis Line Normal sinus rhythm  Left axis deviation  Abnormal ECG    Confirmed by EVERARDO RAGSDALE [facility] ([id number]) on 3/31/2020 11:39:32 AM (03-31-20 @ 00:46)      MEDICATIONS  azithromycin   Tablet 250  cefTRIAXone   IVPB 1000  chlorhexidine 4% Liquid 1  enoxaparin Injectable 40  hydroxychloroquine 200  hydroxychloroquine   methylPREDNISolone sodium succinate Injectable 60  morphine  - Injectable 4  morphine  Infusion 10  norepinephrine Infusion 0.05  pantoprazole  Injectable 40  propofol Infusion 27.344      ANTIBIOTICS:  azithromycin   Tablet 250 milliGRAM(s) Oral daily  cefTRIAXone   IVPB 1000 milliGRAM(s) IV Intermittent every 24 hours  hydroxychloroquine 200 milliGRAM(s) Oral every 12 hours  hydroxychloroquine   Oral       All available historical data has been reviewed

## 2020-04-03 NOTE — PROGRESS NOTE ADULT - ASSESSMENT
Assessment:    Acute hypoxemic respiratory failure   SARS-COV-2 infection   ARDS    PLAN:    CNS: Sedation with  propofol to RASS of -2, reduce to morphine 2    HEENT:  Oral care    PULMONARY:  HOB @ 45 degrees,  trend procalcitonin, CRP, Vent 28/420/15/60, monitor Plateau and Driving pressure.   ABGs     CARDIOVASCULAR: Check QTC. 500 cc LR, than 60 cc/ hr     GI: GI prophylaxis Protonix                                         Feeding: Tube feeds    RENAL:  F/u  lytes.  Correct as needed. accurate I/O, BMP in pm    INFECTIOUS DISEASE: IV abx  ceftriaxone,  Continue HCQ(monitor Qtc),  trend procalcitonin,     HEMATOLOGICAL:  DVT prophylaxis.    ENDOCRINE:  Follow up FS.  Insulin protocol if needed.    MUSCULOSKELETAL: Bed rest for now    CODE STATUS: FULL CODE    DISPOSITION: Patient require continued monitoring in MICU  Left ij   [place]

## 2020-04-03 NOTE — DIETITIAN INITIAL EVALUATION ADULT. - OTHER INFO
Pt adm for COVID-19 Pneumonia. Acute hypoxemic respiratory failure. SARS-COV-2 infection. Severe sepsis with SIRS and lactic acidosis.

## 2020-04-03 NOTE — CHART NOTE - NSCHARTNOTEFT_GEN_A_CORE
Event monitor placed 4/2 and instructions provided.  Please re-call EP when monitoring is no longer indicated or patient is being discharged.    EP spectra [id number]

## 2020-04-03 NOTE — PROGRESS NOTE ADULT - SUBJECTIVE AND OBJECTIVE BOX
Patient is a 55y old  Male who presents with a chief complaint of COVID-19 Pneumonia (03 Apr 2020 10:59)    Over Night Events: On propofol 25. Morphine 5.  Febrile   Off levo    PHYSICAL EXAM    ICU Vital Signs Last 24 Hrs  T(C): 37.8 (03 Apr 2020 02:25), Max: 39 (03 Apr 2020 00:14)  T(F): 100.1 (03 Apr 2020 02:25), Max: 102.2 (03 Apr 2020 00:14)  HR: 97 (03 Apr 2020 12:21) (85 - 102)  BP: 102/62 (03 Apr 2020 12:21) (88/58 - 109/69)  BP(mean): 76 (03 Apr 2020 12:21) (68 - 83)  RR: 20 (03 Apr 2020 08:13) (20 - 22)  SpO2: 95% (03 Apr 2020 12:21) (94% - 96%)      CONSTITUTIONAL:  NAD     ENT:   ETT  Mouth with normal mucosa.   No thrush    CARDIAC:   tachycardic   Regular rhythm.     No edema      RESPIRATORY:   NO wheezing  Bilateral BS  Normal chest expansion  Not tachypneic,  No use of accessory muscles    GASTROINTESTINAL:  Abdomen soft,   Non-tender,   No guarding,   + BS    MUSCULOSKELETAL:   Range of motion is not limited,  No clubbing, cyanosis    NEUROLOGICAL:   Responds to painful stimuli     SKIN:   Skin normal color for race,   warm, dry   No evidence of rash.        04-02-20 @ 07:01  -  04-03-20 @ 07:00  --------------------------------------------------------  IN:    morphine  Infusion: 20 mL    propofol Infusion: 32 mL  Total IN: 52 mL    OUT:    Voided: 700 mL  Total OUT: 700 mL    Total NET: -648 mL      04-03-20 @ 07:01  -  04-03-20 @ 12:42  --------------------------------------------------------  IN:    morphine  Infusion: 35 mL    norepinephrine Infusion: 64 mL  Total IN: 99 mL    OUT:  Total OUT: 0 mL    Total NET: 99 mL          LABS:                            13.7   11.21 )-----------( 305      ( 03 Apr 2020 05:00 )             41.1                                               04-03    137  |  97<L>  |  25<H>  ----------------------------<  100<H>  4.5   |  27  |  1.1    Ca    8.2<L>      03 Apr 2020 05:00  Mg     2.5     04-03    TPro  6.6  /  Alb  2.9<L>  /  TBili  0.8  /  DBili  x   /  AST  86<H>  /  ALT  89<H>  /  AlkPhos  98  04-03      PT/INR - ( 02 Apr 2020 22:00 )   PT: 15.30 sec;   INR: 1.33 ratio         PTT - ( 02 Apr 2020 22:00 )  PTT:35.7 sec                                           CARDIAC MARKERS ( 02 Apr 2020 22:00 )  x     / <0.01 ng/mL / 58 U/L / x     / x                                                LIVER FUNCTIONS - ( 03 Apr 2020 05:00 )  Alb: 2.9 g/dL / Pro: 6.6 g/dL / ALK PHOS: 98 U/L / ALT: 89 U/L / AST: 86 U/L / GGT: x                                                    Mode: AC/ CMV (Assist Control/ Continuous Mandatory Ventilation)  RR (machine): 26  TV (machine): 450  FiO2: 100  PEEP: 12  MAP: 18  PIP: 34                                      ABG - ( 02 Apr 2020 15:27 )  pH, Arterial: 7.34  pH, Blood: x     /  pCO2: 51    /  pO2: 63    / HCO3: 28    / Base Excess: 0.6   /  SaO2: 91        MEDICATIONS  (STANDING):  cefTRIAXone   IVPB 1000 milliGRAM(s) IV Intermittent every 24 hours  chlorhexidine 4% Liquid 1 Application(s) Topical <User Schedule>  enoxaparin Injectable 40 milliGRAM(s) SubCutaneous at bedtime  hydroxychloroquine 200 milliGRAM(s) Oral every 12 hours  hydroxychloroquine   Oral   methylPREDNISolone sodium succinate Injectable 60 milliGRAM(s) IV Push two times a day  morphine  - Injectable 4 milliGRAM(s) IV Push once  morphine  Infusion 10 mG/Hr (10 mL/Hr) IV Continuous <Continuous>  norepinephrine Infusion 0.05 MICROgram(s)/kG/Min (9.14 mL/Hr) IV Continuous <Continuous>  pantoprazole  Injectable 40 milliGRAM(s) IV Push daily  propofol Infusion 27.344 MICROgram(s)/kG/Min (16 mL/Hr) IV Continuous <Continuous>    MEDICATIONS  (PRN):  acetaminophen   Tablet .. 650 milliGRAM(s) Oral every 6 hours PRN Temp greater or equal to 38C (100.4F), Mild Pain (1 - 3)      New X-rays reviewed:          bilateral opacities         IJ [facility]                                                                 ECHO    CXR interpreted by me:

## 2020-04-03 NOTE — DIETITIAN INITIAL EVALUATION ADULT. - PERTINENT MEDS FT
lovenox, abx, LR, dulcolax, lactulose, solumedrol, morphine, senna, levophed, protonix, propofol (14ml/hr = 370 kcals/day)

## 2020-04-03 NOTE — PROGRESS NOTE ADULT - SUBJECTIVE AND OBJECTIVE BOX
Patient is a 55y old  Male who presents with a chief complaint of COVID-19 Pneumonia (03 Apr 2020 10:59)        Over Night Events: No acute events         ROS:  See HPI    PHYSICAL EXAM    ICU Vital Signs Last 24 Hrs  T(C): 37.8 (03 Apr 2020 02:25), Max: 39 (03 Apr 2020 00:14)  T(F): 100.1 (03 Apr 2020 02:25), Max: 102.2 (03 Apr 2020 00:14)  HR: 97 (03 Apr 2020 12:21) (89 - 102)  BP: 102/62 (03 Apr 2020 12:21) (88/58 - 108/69)  BP(mean): 76 (03 Apr 2020 12:21) (68 - 83)  ABP: --  ABP(mean): --  RR: 20 (03 Apr 2020 08:13) (20 - 22)  SpO2: 95% (03 Apr 2020 12:21) (94% - 96%)      04-02-20 @ 07:01  -  04-03-20 @ 07:00  --------------------------------------------------------  IN:    morphine  Infusion: 20 mL    propofol Infusion: 32 mL  Total IN: 52 mL    OUT:    Voided: 700 mL  Total OUT: 700 mL    Total NET: -648 mL      04-03-20 @ 07:01  -  04-03-20 @ 16:28  --------------------------------------------------------  IN:    morphine  Infusion: 43 mL    propofol Infusion: 112 mL  Total IN: 155 mL    OUT:    Indwelling Catheter - Urethral: 275 mL  Total OUT: 275 mL    Total NET: -120 mL          LABS:                            13.7   11.21 )-----------( 305      ( 03 Apr 2020 05:00 )             41.1                                               04-03    137  |  97<L>  |  25<H>  ----------------------------<  100<H>  4.5   |  27  |  1.1    Ca    8.2<L>      03 Apr 2020 05:00  Mg     2.5     04-03    TPro  6.6  /  Alb  2.9<L>  /  TBili  0.8  /  DBili  x   /  AST  86<H>  /  ALT  89<H>  /  AlkPhos  98  04-03      PT/INR - ( 02 Apr 2020 22:00 )   PT: 15.30 sec;   INR: 1.33 ratio         PTT - ( 02 Apr 2020 22:00 )  PTT:35.7 sec                                           CARDIAC MARKERS ( 02 Apr 2020 22:00 )  x     / <0.01 ng/mL / 58 U/L / x     / x                                                LIVER FUNCTIONS - ( 03 Apr 2020 05:00 )  Alb: 2.9 g/dL / Pro: 6.6 g/dL / ALK PHOS: 98 U/L / ALT: 89 U/L / AST: 86 U/L / GGT: x                                                                                               Mode: AC/ CMV (Assist Control/ Continuous Mandatory Ventilation)  RR (machine): 26  TV (machine): 450  FiO2: 100  PEEP: 12  MAP: 18  PIP: 34                                      ABG - ( 02 Apr 2020 15:27 )  pH, Arterial: 7.34  pH, Blood: x     /  pCO2: 51    /  pO2: 63    / HCO3: 28    / Base Excess: 0.6   /  SaO2: 91                  MEDICATIONS  (STANDING):  cefTRIAXone   IVPB 1000 milliGRAM(s) IV Intermittent every 24 hours  chlorhexidine 4% Liquid 1 Application(s) Topical <User Schedule>  enoxaparin Injectable 40 milliGRAM(s) SubCutaneous at bedtime  hydroxychloroquine 200 milliGRAM(s) Oral every 12 hours  hydroxychloroquine   Oral   lactated ringers. 1000 milliLiter(s) (60 mL/Hr) IV Continuous <Continuous>  lactulose Syrup 15 Gram(s) Oral daily  methylPREDNISolone sodium succinate Injectable 60 milliGRAM(s) IV Push two times a day  morphine  Infusion 1 mG/Hr (1 mL/Hr) IV Continuous <Continuous>  norepinephrine Infusion 0.05 MICROgram(s)/kG/Min (9.14 mL/Hr) IV Continuous <Continuous>  pantoprazole  Injectable 40 milliGRAM(s) IV Push daily  propofol Infusion 27.344 MICROgram(s)/kG/Min (16 mL/Hr) IV Continuous <Continuous>  senna 2 Tablet(s) Oral at bedtime    MEDICATIONS  (PRN):  acetaminophen   Tablet .. 650 milliGRAM(s) Oral every 6 hours PRN Temp greater or equal to 38C (100.4F), Mild Pain (1 - 3)  bisacodyl Suppository 10 milliGRAM(s) Rectal daily PRN Constipation      Xrays:                                                                                     ECHO Patient is a 55y old  Male who presents with a chief complaint of COVID-19 Pneumonia (03 Apr 2020 10:59)        Over Night Events: No acute events except for fevers, now resolved.         ROS:  See HPI    PHYSICAL EXAM    ICU Vital Signs Last 24 Hrs  T(C): 37.8 (03 Apr 2020 02:25), Max: 39 (03 Apr 2020 00:14)  T(F): 100.1 (03 Apr 2020 02:25), Max: 102.2 (03 Apr 2020 00:14)  HR: 97 (03 Apr 2020 12:21) (89 - 102)  BP: 102/62 (03 Apr 2020 12:21) (88/58 - 108/69)  BP(mean): 76 (03 Apr 2020 12:21) (68 - 83)  ABP: --  ABP(mean): --  RR: 20 (03 Apr 2020 08:13) (20 - 22)  SpO2: 95% (03 Apr 2020 12:21) (94% - 96%)      04-02-20 @ 07:01  -  04-03-20 @ 07:00  --------------------------------------------------------  IN:    morphine  Infusion: 20 mL    propofol Infusion: 32 mL  Total IN: 52 mL    OUT:    Voided: 700 mL  Total OUT: 700 mL    Total NET: -648 mL      04-03-20 @ 07:01  -  04-03-20 @ 16:28  --------------------------------------------------------  IN:    morphine  Infusion: 43 mL    propofol Infusion: 112 mL  Total IN: 155 mL    OUT:    Indwelling Catheter - Urethral: 275 mL  Total OUT: 275 mL    Total NET: -120 mL          LABS:                            13.7   11.21 )-----------( 305      ( 03 Apr 2020 05:00 )             41.1                                               04-03    137  |  97<L>  |  25<H>  ----------------------------<  100<H>  4.5   |  27  |  1.1    Ca    8.2<L>      03 Apr 2020 05:00  Mg     2.5     04-03    TPro  6.6  /  Alb  2.9<L>  /  TBili  0.8  /  DBili  x   /  AST  86<H>  /  ALT  89<H>  /  AlkPhos  98  04-03      PT/INR - ( 02 Apr 2020 22:00 )   PT: 15.30 sec;   INR: 1.33 ratio         PTT - ( 02 Apr 2020 22:00 )  PTT:35.7 sec                                           CARDIAC MARKERS ( 02 Apr 2020 22:00 )  x     / <0.01 ng/mL / 58 U/L / x     / x                                                LIVER FUNCTIONS - ( 03 Apr 2020 05:00 )  Alb: 2.9 g/dL / Pro: 6.6 g/dL / ALK PHOS: 98 U/L / ALT: 89 U/L / AST: 86 U/L / GGT: x                                                                                               Mode: AC/ CMV (Assist Control/ Continuous Mandatory Ventilation)  RR (machine): 26  TV (machine): 450  FiO2: 100  PEEP: 12  MAP: 18  PIP: 34                                      ABG - ( 02 Apr 2020 15:27 )  pH, Arterial: 7.34  pH, Blood: x     /  pCO2: 51    /  pO2: 63    / HCO3: 28    / Base Excess: 0.6   /  SaO2: 91                  MEDICATIONS  (STANDING):  cefTRIAXone   IVPB 1000 milliGRAM(s) IV Intermittent every 24 hours  chlorhexidine 4% Liquid 1 Application(s) Topical <User Schedule>  enoxaparin Injectable 40 milliGRAM(s) SubCutaneous at bedtime  hydroxychloroquine 200 milliGRAM(s) Oral every 12 hours  hydroxychloroquine   Oral   lactated ringers. 1000 milliLiter(s) (60 mL/Hr) IV Continuous <Continuous>  lactulose Syrup 15 Gram(s) Oral daily  methylPREDNISolone sodium succinate Injectable 60 milliGRAM(s) IV Push two times a day  morphine  Infusion 1 mG/Hr (1 mL/Hr) IV Continuous <Continuous>  norepinephrine Infusion 0.05 MICROgram(s)/kG/Min (9.14 mL/Hr) IV Continuous <Continuous>  pantoprazole  Injectable 40 milliGRAM(s) IV Push daily  propofol Infusion 27.344 MICROgram(s)/kG/Min (16 mL/Hr) IV Continuous <Continuous>  senna 2 Tablet(s) Oral at bedtime    MEDICATIONS  (PRN):  acetaminophen   Tablet .. 650 milliGRAM(s) Oral every 6 hours PRN Temp greater or equal to 38C (100.4F), Mild Pain (1 - 3)  bisacodyl Suppository 10 milliGRAM(s) Rectal daily PRN Constipation      Xrays:                                                                                     ECHO

## 2020-04-03 NOTE — PROGRESS NOTE ADULT - ASSESSMENT
Assessment:    Acute hypoxemic respiratory failure secondary to SARS COV 2  ARDS    PLAN:    CNS: Sedation with propofol, morphine gtt at 2 for pain    HEENT:  Oral care    PULMONARY:  HOB @ 45 degrees, Vent adjusted      CARDIOVASCULAR: Has QTC monitor, on LR at 60    GI: GI prophylaxis Protonix, tube feeding started    RENAL:  F/u  lytes.  Correct as needed. accurate I/O, f/u 8 pm BMP    INFECTIOUS DISEASE: IV abx  ceftriaxone,  Continue HCQ (day 3)  trend procalcitonin (0.21)    HEMATOLOGICAL:  DVT prophylaxis.    ENDOCRINE:  Follow up FS.  Insulin protocol if needed. Solumederol day 1    MUSCULOSKELETAL: Bed rest for now    CODE STATUS: FULL CODE  MICU stay

## 2020-04-03 NOTE — DIETITIAN INITIAL EVALUATION ADULT. - ADD RECOMMEND
Change feeds to Vital HP @ 240ml q6hr (provides 948 kcals, 83 gms protein, 806 ml free water) <- does not include 370 kcals/day from propofol. Flushes per LIP

## 2020-04-04 LAB
ANION GAP SERPL CALC-SCNC: 11 MMOL/L — SIGNIFICANT CHANGE UP (ref 7–14)
BASE EXCESS BLDA CALC-SCNC: 2.3 MMOL/L — HIGH (ref -2–2)
BASOPHILS # BLD AUTO: 0 K/UL — SIGNIFICANT CHANGE UP (ref 0–0.2)
BASOPHILS NFR BLD AUTO: 0 % — SIGNIFICANT CHANGE UP (ref 0–1)
BUN SERPL-MCNC: 44 MG/DL — HIGH (ref 10–20)
CALCIUM SERPL-MCNC: 8.2 MG/DL — LOW (ref 8.5–10.1)
CHLORIDE SERPL-SCNC: 97 MMOL/L — LOW (ref 98–110)
CO2 SERPL-SCNC: 27 MMOL/L — SIGNIFICANT CHANGE UP (ref 17–32)
CREAT SERPL-MCNC: 1.1 MG/DL — SIGNIFICANT CHANGE UP (ref 0.7–1.5)
CRP SERPL-MCNC: 24.6 MG/DL — HIGH (ref 0–0.4)
EOSINOPHIL # BLD AUTO: 0 K/UL — SIGNIFICANT CHANGE UP (ref 0–0.7)
EOSINOPHIL NFR BLD AUTO: 0 % — SIGNIFICANT CHANGE UP (ref 0–8)
GLUCOSE SERPL-MCNC: 377 MG/DL — HIGH (ref 70–99)
HCO3 BLDA-SCNC: 30 MMOL/L — HIGH (ref 21–29)
HCT VFR BLD CALC: 35.7 % — LOW (ref 42–52)
HGB BLD-MCNC: 12.2 G/DL — LOW (ref 14–18)
IMM GRANULOCYTES NFR BLD AUTO: 0.6 % — HIGH (ref 0.1–0.3)
LYMPHOCYTES # BLD AUTO: 0.57 K/UL — LOW (ref 1.2–3.4)
LYMPHOCYTES # BLD AUTO: 5.6 % — LOW (ref 20.5–51.1)
MAGNESIUM SERPL-MCNC: 3 MG/DL — HIGH (ref 1.8–2.4)
MCHC RBC-ENTMCNC: 30.4 PG — SIGNIFICANT CHANGE UP (ref 27–31)
MCHC RBC-ENTMCNC: 34.2 G/DL — SIGNIFICANT CHANGE UP (ref 32–37)
MCV RBC AUTO: 89 FL — SIGNIFICANT CHANGE UP (ref 80–94)
MONOCYTES # BLD AUTO: 0.26 K/UL — SIGNIFICANT CHANGE UP (ref 0.1–0.6)
MONOCYTES NFR BLD AUTO: 2.5 % — SIGNIFICANT CHANGE UP (ref 1.7–9.3)
NEUTROPHILS # BLD AUTO: 9.36 K/UL — HIGH (ref 1.4–6.5)
NEUTROPHILS NFR BLD AUTO: 91.3 % — HIGH (ref 42.2–75.2)
NRBC # BLD: 0 /100 WBCS — SIGNIFICANT CHANGE UP (ref 0–0)
PCO2 BLDA: 54 MMHG — HIGH (ref 38–42)
PH BLDA: 7.34 — LOW (ref 7.38–7.42)
PLATELET # BLD AUTO: 307 K/UL — SIGNIFICANT CHANGE UP (ref 130–400)
PO2 BLDA: 82 MMHG — SIGNIFICANT CHANGE UP (ref 78–95)
POTASSIUM SERPL-MCNC: 4.8 MMOL/L — SIGNIFICANT CHANGE UP (ref 3.5–5)
POTASSIUM SERPL-SCNC: 4.8 MMOL/L — SIGNIFICANT CHANGE UP (ref 3.5–5)
PROCALCITONIN SERPL-MCNC: 0.46 NG/ML — HIGH (ref 0.02–0.1)
PTH RELATED PROT SERPL-MCNC: <2 PMOL/L — SIGNIFICANT CHANGE UP
RBC # BLD: 4.01 M/UL — LOW (ref 4.7–6.1)
RBC # FLD: 11.9 % — SIGNIFICANT CHANGE UP (ref 11.5–14.5)
SAO2 % BLDA: 96 % — SIGNIFICANT CHANGE UP (ref 92–96)
SODIUM SERPL-SCNC: 135 MMOL/L — SIGNIFICANT CHANGE UP (ref 135–146)
WBC # BLD: 10.25 K/UL — SIGNIFICANT CHANGE UP (ref 4.8–10.8)
WBC # FLD AUTO: 10.25 K/UL — SIGNIFICANT CHANGE UP (ref 4.8–10.8)

## 2020-04-04 PROCEDURE — 71045 X-RAY EXAM CHEST 1 VIEW: CPT | Mod: 26

## 2020-04-04 PROCEDURE — 71045 X-RAY EXAM CHEST 1 VIEW: CPT | Mod: 26,77

## 2020-04-04 PROCEDURE — 99222 1ST HOSP IP/OBS MODERATE 55: CPT

## 2020-04-04 RX ADMIN — CHLORHEXIDINE GLUCONATE 1 APPLICATION(S): 213 SOLUTION TOPICAL at 05:21

## 2020-04-04 RX ADMIN — LACTULOSE 15 GRAM(S): 10 SOLUTION ORAL at 11:53

## 2020-04-04 RX ADMIN — Medication 60 MILLIGRAM(S): at 16:15

## 2020-04-04 RX ADMIN — Medication 200 MILLIGRAM(S): at 08:09

## 2020-04-04 RX ADMIN — SENNA PLUS 2 TABLET(S): 8.6 TABLET ORAL at 21:16

## 2020-04-04 RX ADMIN — ENOXAPARIN SODIUM 40 MILLIGRAM(S): 100 INJECTION SUBCUTANEOUS at 21:16

## 2020-04-04 RX ADMIN — Medication 200 MILLIGRAM(S): at 21:16

## 2020-04-04 RX ADMIN — ENOXAPARIN SODIUM 40 MILLIGRAM(S): 100 INJECTION SUBCUTANEOUS at 01:13

## 2020-04-04 RX ADMIN — CEFTRIAXONE 100 MILLIGRAM(S): 500 INJECTION, POWDER, FOR SOLUTION INTRAMUSCULAR; INTRAVENOUS at 16:14

## 2020-04-04 RX ADMIN — Medication 60 MILLIGRAM(S): at 05:20

## 2020-04-04 RX ADMIN — PANTOPRAZOLE SODIUM 40 MILLIGRAM(S): 20 TABLET, DELAYED RELEASE ORAL at 11:54

## 2020-04-04 NOTE — PROGRESS NOTE ADULT - ASSESSMENT
Assessment:    Acute hypoxemic respiratory failure   SARS-COV-2 infection   ARDS    PLAN:    CNS: Sedation     HEENT:  Oral care    PULMONARY:  HOB @ 45 degrees,  Peep 12. Wean O2    CARDIOVASCULAR: MOnitor QTC.  DC IVF     GI: GI prophylaxis Protonix                                         Feeding: Tube feeds./  Bowel regimen     RENAL:  F/u  lytes.  Correct as needed. accurate I/O,     INFECTIOUS DISEASE: Finish Ceftriaxone course.  Continue HCQ(monitor Qtc),      HEMATOLOGICAL:  DVT prophylaxis.    ENDOCRINE:  Follow up FS.  Insulin protocol if needed.      MUSCULOSKELETAL: Bed rest for now    CODE STATUS: FULL CODE    DISPOSITION: Patient require continued monitoring in MICU  Left ij   [place]

## 2020-04-04 NOTE — PROGRESS NOTE ADULT - SUBJECTIVE AND OBJECTIVE BOX
Patient is a 55y old  Male who presents with a chief complaint of COVID-19 Pneumonia (03 Apr 2020 10:59)        Over Night Events:  On MV.  Off pressors.  Sedated         ROS:     All ROS are negative except HPI         PHYSICAL EXAM    ICU Vital Signs Last 24 Hrs  T(C): 36.5 (04 Apr 2020 08:56), Max: 38.7 (03 Apr 2020 16:50)  T(F): 97.7 (04 Apr 2020 08:56), Max: 101.7 (03 Apr 2020 16:50)  HR: 74 (04 Apr 2020 11:02) (74 - 101)  BP: 88/57 (04 Apr 2020 11:02) (88/57 - 155/88)  BP(mean): 62 (04 Apr 2020 11:02) (62 - 126)  ABP: --  ABP(mean): --  RR: 27 (04 Apr 2020 11:02) (18 - 30)  SpO2: 96% (04 Apr 2020 11:02) (95% - 100%)      CONSTITUTIONAL:  Well nourished.  NAD    ENT:   Airway patent,   Mouth with normal mucosa.   No thrush    EYES:   Pupils equal,   Round and reactive to light.    CARDIAC:   Normal rate,   Regular rhythm.    No edema      Vascular:  Normal systolic impulse  No Carotid bruits    RESPIRATORY:   No wheezing  Bilateral BS  Normal chest expansion  Not tachypneic,  No use of accessory muscles    GASTROINTESTINAL:  Abdomen soft,   Non-tender,   No guarding,   + BS    GENITOURINARY  normal genitalia for sex  no edema    MUSCULOSKELETAL:   Range of motion is not limited,  No clubbing, cyanosis    NEUROLOGICAL:   Sedated     SKIN:   Skin normal color for race,   Warm and dry and intact.   No evidence of rash.    PSYCHIATRIC:   Sedated   No apparent risk to self or others.    HEMATOLOGICAL:  No cervical  lymphadenopathy.  no inguinal lymphadenopathy      04-03-20 @ 07:01  -  04-04-20 @ 07:00  --------------------------------------------------------  IN:    Enteral Tube Flush: 150 mL    lactated ringers.: 1080 mL    morphine  Infusion: 15 mL    morphine  Infusion: 43 mL    ns in tub fed  [id number]: 460 mL    propofol Infusion: 322 mL  Total IN: 2070 mL    OUT:    Indwelling Catheter - Urethral: 1075 mL  Total OUT: 1075 mL    Total NET: 995 mL      04-04-20 @ 07:01  -  04-04-20 @ 11:33  --------------------------------------------------------  IN:    lactated ringers.: 240 mL    morphine  Infusion: 4 mL    propofol Infusion: 49 mL  Total IN: 293 mL    OUT:  Total OUT: 0 mL    Total NET: 293 mL          LABS:                            12.2   10.25 )-----------( 307      ( 04 Apr 2020 06:59 )             35.7                                               04-04    135  |  97<L>  |  44<H>  ----------------------------<  377<H>  4.8   |  27  |  1.1    04 Apr 2020 06:59    135    |  97<L>  |  44<H>  ----------------------------<  377<H>  4.8     |  27     |  1.1    03 Apr 2020 20:55    137    |  99     |  38<H>  ----------------------------<  281<H>  5.1<H>   |  26     |  1.2      Ca    8.2<L>      04 Apr 2020 06:59  Ca    8.3<L>      03 Apr 2020 20:55  Mg     3.0<H>     04 Apr 2020 06:59  Mg     2.9<H>     03 Apr 2020 20:55    TPro  6.6    /  Alb  2.9<L>  /  TBili  0.8    /  DBili  x      /  AST  86<H>  /  ALT  89<H>  /  AlkPhos  98     03 Apr 2020 05:00      Ca    8.2<L>      04 Apr 2020 06:59  Mg     3.0     04-04    TPro  6.6  /  Alb  2.9<L>  /  TBili  0.8  /  DBili  x   /  AST  86<H>  /  ALT  89<H>  /  AlkPhos  98  04-03      PT/INR - ( 02 Apr 2020 22:00 )   PT: 15.30 sec;   INR: 1.33 ratio         PTT - ( 02 Apr 2020 22:00 )  PTT:35.7 sec                                           CARDIAC MARKERS ( 02 Apr 2020 22:00 )  x     / <0.01 ng/mL / 58 U/L / x     / x                                                LIVER FUNCTIONS - ( 03 Apr 2020 05:00 )  Alb: 2.9 g/dL / Pro: 6.6 g/dL / ALK PHOS: 98 U/L / ALT: 89 U/L / AST: 86 U/L / GGT: x                                                                                               Mode: AC/ CMV (Assist Control/ Continuous Mandatory Ventilation)  RR (machine): 26  TV (machine): 420  FiO2: 50  PEEP: 15  ITime: 1  MAP: 19  PIP: 35                                      ABG - ( 04 Apr 2020 05:09 )  pH, Arterial: 7.34  pH, Blood: x     /  pCO2: 54    /  pO2: 82    / HCO3: 30    / Base Excess: 2.3   /  SaO2: 96                  MEDICATIONS  (STANDING):  cefTRIAXone   IVPB 1000 milliGRAM(s) IV Intermittent every 24 hours  chlorhexidine 4% Liquid 1 Application(s) Topical <User Schedule>  enoxaparin Injectable 40 milliGRAM(s) SubCutaneous at bedtime  hydroxychloroquine 200 milliGRAM(s) Oral every 12 hours  hydroxychloroquine   Oral   lactated ringers. 1000 milliLiter(s) (60 mL/Hr) IV Continuous <Continuous>  lactulose Syrup 15 Gram(s) Oral daily  methylPREDNISolone sodium succinate Injectable 60 milliGRAM(s) IV Push two times a day  morphine  Infusion 1 mG/Hr (1 mL/Hr) IV Continuous <Continuous>  norepinephrine Infusion 0.05 MICROgram(s)/kG/Min (9.14 mL/Hr) IV Continuous <Continuous>  pantoprazole  Injectable 40 milliGRAM(s) IV Push daily  propofol Infusion 27.344 MICROgram(s)/kG/Min (16 mL/Hr) IV Continuous <Continuous>  senna 2 Tablet(s) Oral at bedtime    MEDICATIONS  (PRN):  acetaminophen   Tablet .. 650 milliGRAM(s) Oral every 6 hours PRN Temp greater or equal to 38C (100.4F), Mild Pain (1 - 3)  bisacodyl Suppository 10 milliGRAM(s) Rectal daily PRN Constipation      New X-rays reviewed:                                                                                  ECHO    CXR interpreted by me:  ET high.  Improved  infiltrates

## 2020-04-05 LAB
ALBUMIN SERPL ELPH-MCNC: 2.8 G/DL — LOW (ref 3.5–5.2)
ALP SERPL-CCNC: 77 U/L — SIGNIFICANT CHANGE UP (ref 30–115)
ALT FLD-CCNC: 59 U/L — HIGH (ref 0–41)
ANION GAP SERPL CALC-SCNC: 11 MMOL/L — SIGNIFICANT CHANGE UP (ref 7–14)
AST SERPL-CCNC: 34 U/L — SIGNIFICANT CHANGE UP (ref 0–41)
BASE EXCESS BLDA CALC-SCNC: 4.8 MMOL/L — HIGH (ref -2–2)
BASOPHILS # BLD AUTO: 0.01 K/UL — SIGNIFICANT CHANGE UP (ref 0–0.2)
BASOPHILS NFR BLD AUTO: 0.1 % — SIGNIFICANT CHANGE UP (ref 0–1)
BILIRUB SERPL-MCNC: 0.3 MG/DL — SIGNIFICANT CHANGE UP (ref 0.2–1.2)
BUN SERPL-MCNC: 55 MG/DL — HIGH (ref 10–20)
CALCIUM SERPL-MCNC: 8.2 MG/DL — LOW (ref 8.5–10.1)
CHLORIDE SERPL-SCNC: 100 MMOL/L — SIGNIFICANT CHANGE UP (ref 98–110)
CO2 SERPL-SCNC: 28 MMOL/L — SIGNIFICANT CHANGE UP (ref 17–32)
CREAT SERPL-MCNC: 1.2 MG/DL — SIGNIFICANT CHANGE UP (ref 0.7–1.5)
EOSINOPHIL # BLD AUTO: 0 K/UL — SIGNIFICANT CHANGE UP (ref 0–0.7)
EOSINOPHIL NFR BLD AUTO: 0 % — SIGNIFICANT CHANGE UP (ref 0–8)
GAS PNL BLDA: SIGNIFICANT CHANGE UP
GLUCOSE SERPL-MCNC: 414 MG/DL — HIGH (ref 70–99)
HCO3 BLDA-SCNC: 31 MMOL/L — HIGH (ref 23–27)
HCT VFR BLD CALC: 34.1 % — LOW (ref 42–52)
HGB BLD-MCNC: 11.3 G/DL — LOW (ref 14–18)
HOROWITZ INDEX BLDA+IHG-RTO: 45 — SIGNIFICANT CHANGE UP
IMM GRANULOCYTES NFR BLD AUTO: 1.1 % — HIGH (ref 0.1–0.3)
LYMPHOCYTES # BLD AUTO: 0.76 K/UL — LOW (ref 1.2–3.4)
LYMPHOCYTES # BLD AUTO: 5.2 % — LOW (ref 20.5–51.1)
MAGNESIUM SERPL-MCNC: 3.4 MG/DL — CRITICAL HIGH (ref 1.8–2.4)
MCHC RBC-ENTMCNC: 29.3 PG — SIGNIFICANT CHANGE UP (ref 27–31)
MCHC RBC-ENTMCNC: 33.1 G/DL — SIGNIFICANT CHANGE UP (ref 32–37)
MCV RBC AUTO: 88.3 FL — SIGNIFICANT CHANGE UP (ref 80–94)
MONOCYTES # BLD AUTO: 0.57 K/UL — SIGNIFICANT CHANGE UP (ref 0.1–0.6)
MONOCYTES NFR BLD AUTO: 3.9 % — SIGNIFICANT CHANGE UP (ref 1.7–9.3)
NEUTROPHILS # BLD AUTO: 13.22 K/UL — HIGH (ref 1.4–6.5)
NEUTROPHILS NFR BLD AUTO: 89.7 % — HIGH (ref 42.2–75.2)
NRBC # BLD: 0 /100 WBCS — SIGNIFICANT CHANGE UP (ref 0–0)
PCO2 BLDA: 50 MMHG — HIGH (ref 38–42)
PH BLDA: 7.4 — SIGNIFICANT CHANGE UP (ref 7.38–7.42)
PLATELET # BLD AUTO: 356 K/UL — SIGNIFICANT CHANGE UP (ref 130–400)
PO2 BLDA: 116 MMHG — HIGH (ref 78–95)
POTASSIUM SERPL-MCNC: 5.2 MMOL/L — HIGH (ref 3.5–5)
POTASSIUM SERPL-SCNC: 5.2 MMOL/L — HIGH (ref 3.5–5)
PROT SERPL-MCNC: 6.1 G/DL — SIGNIFICANT CHANGE UP (ref 6–8)
RBC # BLD: 3.86 M/UL — LOW (ref 4.7–6.1)
RBC # FLD: 12 % — SIGNIFICANT CHANGE UP (ref 11.5–14.5)
SAO2 % BLDA: 99 % — HIGH (ref 94–98)
SODIUM SERPL-SCNC: 139 MMOL/L — SIGNIFICANT CHANGE UP (ref 135–146)
WBC # BLD: 14.72 K/UL — HIGH (ref 4.8–10.8)
WBC # FLD AUTO: 14.72 K/UL — HIGH (ref 4.8–10.8)

## 2020-04-05 PROCEDURE — 71045 X-RAY EXAM CHEST 1 VIEW: CPT | Mod: 26

## 2020-04-05 PROCEDURE — 71045 X-RAY EXAM CHEST 1 VIEW: CPT | Mod: 26,76,77

## 2020-04-05 PROCEDURE — 99232 SBSQ HOSP IP/OBS MODERATE 35: CPT

## 2020-04-05 RX ADMIN — CHLORHEXIDINE GLUCONATE 1 APPLICATION(S): 213 SOLUTION TOPICAL at 05:48

## 2020-04-05 RX ADMIN — PANTOPRAZOLE SODIUM 40 MILLIGRAM(S): 20 TABLET, DELAYED RELEASE ORAL at 11:39

## 2020-04-05 RX ADMIN — LACTULOSE 15 GRAM(S): 10 SOLUTION ORAL at 11:39

## 2020-04-05 RX ADMIN — Medication 200 MILLIGRAM(S): at 08:09

## 2020-04-05 RX ADMIN — ENOXAPARIN SODIUM 40 MILLIGRAM(S): 100 INJECTION SUBCUTANEOUS at 21:24

## 2020-04-05 RX ADMIN — SENNA PLUS 2 TABLET(S): 8.6 TABLET ORAL at 21:24

## 2020-04-05 RX ADMIN — Medication 60 MILLIGRAM(S): at 05:48

## 2020-04-05 RX ADMIN — CEFTRIAXONE 100 MILLIGRAM(S): 500 INJECTION, POWDER, FOR SOLUTION INTRAMUSCULAR; INTRAVENOUS at 16:29

## 2020-04-05 RX ADMIN — Medication 60 MILLIGRAM(S): at 17:20

## 2020-04-05 NOTE — PROGRESS NOTE ADULT - SUBJECTIVE AND OBJECTIVE BOX
Patient is a 55y old  Male who presents with a chief complaint of ARF (04 Apr 2020 11:32)        Over Night Events:  on MV.  Off pressors.  Sedated.  follows commands         ROS:     All ROS are negative except HPI         PHYSICAL EXAM    ICU Vital Signs Last 24 Hrs  T(C): 37.1 (05 Apr 2020 11:23), Max: 37.1 (04 Apr 2020 15:53)  T(F): 98.8 (05 Apr 2020 11:23), Max: 98.8 (04 Apr 2020 15:53)  HR: 70 (05 Apr 2020 11:23) (67 - 81)  BP: 105/63 (05 Apr 2020 11:23) (93/56 - 111/64)  BP(mean): 71 (05 Apr 2020 11:23) (65 - 81)  ABP: --  ABP(mean): --  RR: 24 (05 Apr 2020 11:23) (23 - 24)  SpO2: 96% (05 Apr 2020 00:18) (96% - 97%)      CONSTITUTIONAL:    Well nourished.  NAD    ENT:   Airway patent,   Mouth with normal mucosa.   No thrush    EYES:   Pupils equal,   Round and reactive to light.    CARDIAC:   Normal rate,   Regular rhythm.    No edema      Vascular:  Normal systolic impulse  No Carotid bruits    RESPIRATORY:   No wheezing  Bilateral BS  Normal chest expansion  Not tachypneic,  No use of accessory muscles    GASTROINTESTINAL:  Abdomen soft,   Non-tender,   No guarding,   + BS    GENITOURINARY  normal genitalia for sex  no edema    MUSCULOSKELETAL:   Range of motion is not limited,  No clubbing, cyanosis    NEUROLOGICAL:   Sedated  Follows commands     SKIN:   Skin normal color for race,   Warm and dry and intact.   No evidence of rash.    PSYCHIATRIC:   Sedated  No apparent risk to self or others.    HEMATOLOGICAL:  No cervical  lymphadenopathy.  no inguinal lymphadenopathy      04-04-20 @ 07:01  -  04-05-20 @ 07:00  --------------------------------------------------------  IN:    Enteral Tube Flush: 100 mL    IV PiggyBack: 50 mL    lactated ringers.: 240 mL    morphine  Infusion: 24 mL    ns in tub fed  [id number]: 240 mL    propofol Infusion: 274.4 mL  Total IN: 928.4 mL    OUT:    Indwelling Catheter - Urethral: 1500 mL  Total OUT: 1500 mL    Total NET: -571.6 mL      04-05-20 @ 07:01  -  04-05-20 @ 11:51  --------------------------------------------------------  IN:    morphine  Infusion: 3 mL    propofol Infusion: 36 mL  Total IN: 39 mL    OUT:  Total OUT: 0 mL    Total NET: 39 mL          LABS:                            11.3   14.72 )-----------( 356      ( 05 Apr 2020 04:30 )             34.1                                               04-05    139  |  100  |  55<H>  ----------------------------<  414<H>  5.2<H>   |  28  |  1.2    05 Apr 2020 04:30    139    |  100    |  55<H>  ----------------------------<  414<H>  5.2<H>   |  28     |  1.2    04 Apr 2020 06:59    135    |  97<L>  |  44<H>  ----------------------------<  377<H>  4.8     |  27     |  1.1      Ca    8.2<L>      05 Apr 2020 04:30  Ca    8.2<L>      04 Apr 2020 06:59  Mg     3.4<HH>     05 Apr 2020 04:30  Mg     3.0<H>     04 Apr 2020 06:59    TPro  6.1    /  Alb  2.8<L>  /  TBili  0.3    /  DBili  x      /  AST  34     /  ALT  59<H>  /  AlkPhos  77     05 Apr 2020 04:30      Ca    8.2<L>      05 Apr 2020 04:30  Mg     3.4     04-05    TPro  6.1  /  Alb  2.8<L>  /  TBili  0.3  /  DBili  x   /  AST  34  /  ALT  59<H>  /  AlkPhos  77  04-05                                                                                           LIVER FUNCTIONS - ( 05 Apr 2020 04:30 )  Alb: 2.8 g/dL / Pro: 6.1 g/dL / ALK PHOS: 77 U/L / ALT: 59 U/L / AST: 34 U/L / GGT: x                                                                                               Mode: AC/ CMV (Assist Control/ Continuous Mandatory Ventilation)  RR (machine): 23  TV (machine): 420  FiO2: 45  PEEP: 15  ITime: 1  MAP: 21  PIP: 38                                      ABG - ( 05 Apr 2020 03:29 )  pH, Arterial: 7.40  pH, Blood: x     /  pCO2: 50    /  pO2: 116   / HCO3: 31    / Base Excess: 4.8   /  SaO2: 99    PPl 23              MEDICATIONS  (STANDING):  cefTRIAXone   IVPB 1000 milliGRAM(s) IV Intermittent every 24 hours  chlorhexidine 4% Liquid 1 Application(s) Topical <User Schedule>  enoxaparin Injectable 40 milliGRAM(s) SubCutaneous at bedtime  lactulose Syrup 15 Gram(s) Oral daily  methylPREDNISolone sodium succinate Injectable 60 milliGRAM(s) IV Push two times a day  morphine  Infusion 1 mG/Hr (1 mL/Hr) IV Continuous <Continuous>  norepinephrine Infusion 0.05 MICROgram(s)/kG/Min (9.14 mL/Hr) IV Continuous <Continuous>  pantoprazole  Injectable 40 milliGRAM(s) IV Push daily  propofol Infusion 27.344 MICROgram(s)/kG/Min (16 mL/Hr) IV Continuous <Continuous>  senna 2 Tablet(s) Oral at bedtime    MEDICATIONS  (PRN):  acetaminophen   Tablet .. 650 milliGRAM(s) Oral every 6 hours PRN Temp greater or equal to 38C (100.4F), Mild Pain (1 - 3)  bisacodyl Suppository 10 milliGRAM(s) Rectal daily PRN Constipation      New X-rays reviewed:                                                                                  ECHO    CXR interpreted by me:  ET high OG OK>  Improving infiltrates

## 2020-04-05 NOTE — PROGRESS NOTE ADULT - ASSESSMENT
56 y/o M from home with PMH of Psoriasis and no PSH presents with fever x 15 days to [facility], COVID 19 +, transferred to [facility] for bed availability, found to be hypoxic, requiring mechanical ventilation     Assessment:    Acute hypoxemic respiratory failure secondary to SARS COV 2  ARDS    PLAN:    CNS: Sedation with propofol, morphine gtt at 2 for pain, off pressors,     HEENT:  Oral care    PULMONARY:  HOB @ 45 degrees, Vent adjusted per pulm. MAP >60, s/p EPS monitor    CARDIOVASCULAR: , s/p monitor    GI: GI prophylaxis Protonix, tube feeding started    RENAL:  F/u  lytes.  Correct as needed. accurate I/O, f/u 8 pm BMP    INFECTIOUS DISEASE: IV abx  ceftriaxone,  Continue HCQ (day 4)  procalcitonin uptrend trend q48    HEMATOLOGICAL:  DVT prophylaxis.    ENDOCRINE:  Follow up FS.  Insulin protocol if needed. Solumederol day 3    MUSCULOSKELETAL: Bed rest for now    CODE STATUS: FULL CODE  MICU stay

## 2020-04-05 NOTE — PROGRESS NOTE ADULT - ASSESSMENT
Assessment:    Acute hypoxemic respiratory failure   SARS-COV-2 infection   ARDS    PLAN:    CNS: Sedation     HEENT:  Oral care    PULMONARY:  HOB @ 45 degrees,  Peep 12. If tolerates, Decrease to 10 8 hours later.  Wean O2.  Advance ET 2 cms    CARDIOVASCULAR: Monitor QTC.      GI: GI prophylaxis Protonix                                         Feeding: Tube feeds./  Bowel regimen     RENAL:  F/u  lytes.  Correct as needed. accurate I/O,     INFECTIOUS DISEASE: Finish Ceftriaxone course.  Continue HCQ    HEMATOLOGICAL:  DVT prophylaxis.    ENDOCRINE:  Follow up FS.  Insulin protocol if needed.  Solumedrol D#3    MUSCULOSKELETAL: Bed rest for now    CODE STATUS: FULL CODE    DISPOSITION: Patient require continued monitoring in MICU

## 2020-04-05 NOTE — PROGRESS NOTE ADULT - SUBJECTIVE AND OBJECTIVE BOX
Patient is a 55y old  Male who presents with a chief complaint of arf (05 Apr 2020 11:50)        Over Night Events: afebrile overnight, sedated, off pressors.         ROS:  See HPI    PHYSICAL EXAM    ICU Vital Signs Last 24 Hrs  T(C): 37.5 (05 Apr 2020 15:46), Max: 37.5 (05 Apr 2020 15:46)  T(F): 99.5 (05 Apr 2020 15:46), Max: 99.5 (05 Apr 2020 15:46)  HR: 77 (05 Apr 2020 15:46) (67 - 77)  BP: 105/58 (05 Apr 2020 15:46) (93/56 - 111/64)  BP(mean): 71 (05 Apr 2020 11:23) (65 - 81)  ABP: --  ABP(mean): --  RR: 26 (05 Apr 2020 15:46) (23 - 26)  SpO2: 96% (05 Apr 2020 00:18) (96% - 97%)      04-04-20 @ 07:01  -  04-05-20 @ 07:00  --------------------------------------------------------  IN:    Enteral Tube Flush: 100 mL    IV PiggyBack: 50 mL    lactated ringers.: 240 mL    morphine  Infusion: 24 mL    ns in tub fed  [id number]: 240 mL    propofol Infusion: 274.4 mL  Total IN: 928.4 mL    OUT:    Indwelling Catheter - Urethral: 1500 mL  Total OUT: 1500 mL    Total NET: -571.6 mL      04-05-20 @ 07:01  -  04-05-20 @ 18:02  --------------------------------------------------------  IN:    IV PiggyBack: 50 mL    morphine  Infusion: 9 mL    ns in tub fed  [id number]: 240 mL    propofol Infusion: 120 mL  Total IN: 419 mL    OUT:    Indwelling Catheter - Urethral: 400 mL  Total OUT: 400 mL    Total NET: 19 mL          LABS:                            11.3   14.72 )-----------( 356      ( 05 Apr 2020 04:30 )             34.1                                               04-05    139  |  100  |  55<H>  ----------------------------<  414<H>  5.2<H>   |  28  |  1.2    Ca    8.2<L>      05 Apr 2020 04:30  Mg     3.4     04-05    TPro  6.1  /  Alb  2.8<L>  /  TBili  0.3  /  DBili  x   /  AST  34  /  ALT  59<H>  /  AlkPhos  77  04-05                                                                                           LIVER FUNCTIONS - ( 05 Apr 2020 04:30 )  Alb: 2.8 g/dL / Pro: 6.1 g/dL / ALK PHOS: 77 U/L / ALT: 59 U/L / AST: 34 U/L / GGT: x                                                                                               Mode: AC/ CMV (Assist Control/ Continuous Mandatory Ventilation)  RR (machine): 23  TV (machine): 420  FiO2: 45  PEEP: 15  ITime: 1  MAP: 21  PIP: 38                                      ABG - ( 05 Apr 2020 03:29 )  pH, Arterial: 7.40  pH, Blood: x     /  pCO2: 50    /  pO2: 116   / HCO3: 31    / Base Excess: 4.8   /  SaO2: 99            MEDICATIONS  (STANDING):  cefTRIAXone   IVPB 1000 milliGRAM(s) IV Intermittent every 24 hours  chlorhexidine 4% Liquid 1 Application(s) Topical <User Schedule>  enoxaparin Injectable 40 milliGRAM(s) SubCutaneous at bedtime  lactulose Syrup 15 Gram(s) Oral daily  methylPREDNISolone sodium succinate Injectable 60 milliGRAM(s) IV Push two times a day  morphine  Infusion 1 mG/Hr (1 mL/Hr) IV Continuous <Continuous>  norepinephrine Infusion 0.05 MICROgram(s)/kG/Min (9.14 mL/Hr) IV Continuous <Continuous>  pantoprazole  Injectable 40 milliGRAM(s) IV Push daily  propofol Infusion 27.344 MICROgram(s)/kG/Min (16 mL/Hr) IV Continuous <Continuous>  senna 2 Tablet(s) Oral at bedtime    MEDICATIONS  (PRN):  acetaminophen   Tablet .. 650 milliGRAM(s) Oral every 6 hours PRN Temp greater or equal to 38C (100.4F), Mild Pain (1 - 3)  bisacodyl Suppository 10 milliGRAM(s) Rectal daily PRN Constipation      Xrays:                                                                                     ECHO

## 2020-04-06 LAB
ANION GAP SERPL CALC-SCNC: 10 MMOL/L — SIGNIFICANT CHANGE UP (ref 7–14)
ANION GAP SERPL CALC-SCNC: 9 MMOL/L — SIGNIFICANT CHANGE UP (ref 7–14)
BASE EXCESS BLDA CALC-SCNC: 7.3 MMOL/L — HIGH (ref -2–2)
BASE EXCESS BLDA CALC-SCNC: 8.1 MMOL/L — HIGH (ref -2–2)
BASOPHILS # BLD AUTO: 0.01 K/UL — SIGNIFICANT CHANGE UP (ref 0–0.2)
BASOPHILS NFR BLD AUTO: 0.1 % — SIGNIFICANT CHANGE UP (ref 0–1)
BUN SERPL-MCNC: 50 MG/DL — HIGH (ref 10–20)
BUN SERPL-MCNC: 54 MG/DL — HIGH (ref 10–20)
CALCIUM SERPL-MCNC: 8 MG/DL — LOW (ref 8.5–10.1)
CALCIUM SERPL-MCNC: 8.3 MG/DL — LOW (ref 8.5–10.1)
CHLORIDE SERPL-SCNC: 105 MMOL/L — SIGNIFICANT CHANGE UP (ref 98–110)
CHLORIDE SERPL-SCNC: 105 MMOL/L — SIGNIFICANT CHANGE UP (ref 98–110)
CO2 SERPL-SCNC: 28 MMOL/L — SIGNIFICANT CHANGE UP (ref 17–32)
CO2 SERPL-SCNC: 29 MMOL/L — SIGNIFICANT CHANGE UP (ref 17–32)
CREAT SERPL-MCNC: 0.9 MG/DL — SIGNIFICANT CHANGE UP (ref 0.7–1.5)
CREAT SERPL-MCNC: 1.1 MG/DL — SIGNIFICANT CHANGE UP (ref 0.7–1.5)
EOSINOPHIL # BLD AUTO: 0 K/UL — SIGNIFICANT CHANGE UP (ref 0–0.7)
EOSINOPHIL NFR BLD AUTO: 0 % — SIGNIFICANT CHANGE UP (ref 0–8)
GAS PNL BLDA: SIGNIFICANT CHANGE UP
GLUCOSE BLDC GLUCOMTR-MCNC: 215 MG/DL — HIGH (ref 70–99)
GLUCOSE BLDC GLUCOMTR-MCNC: 231 MG/DL — HIGH (ref 70–99)
GLUCOSE BLDC GLUCOMTR-MCNC: 235 MG/DL — HIGH (ref 70–99)
GLUCOSE BLDC GLUCOMTR-MCNC: 425 MG/DL — HIGH (ref 70–99)
GLUCOSE SERPL-MCNC: 402 MG/DL — HIGH (ref 70–99)
GLUCOSE SERPL-MCNC: 480 MG/DL — CRITICAL HIGH (ref 70–99)
HCO3 BLDA-SCNC: 32 MMOL/L — HIGH (ref 23–27)
HCO3 BLDA-SCNC: 35 MMOL/L — HIGH (ref 23–27)
HCT VFR BLD CALC: 34.5 % — LOW (ref 42–52)
HGB BLD-MCNC: 11.5 G/DL — LOW (ref 14–18)
HOROWITZ INDEX BLDA+IHG-RTO: 100 — SIGNIFICANT CHANGE UP
HOROWITZ INDEX BLDA+IHG-RTO: 40 — SIGNIFICANT CHANGE UP
IMM GRANULOCYTES NFR BLD AUTO: 0.9 % — HIGH (ref 0.1–0.3)
LYMPHOCYTES # BLD AUTO: 0.65 K/UL — LOW (ref 1.2–3.4)
LYMPHOCYTES # BLD AUTO: 6 % — LOW (ref 20.5–51.1)
MAGNESIUM SERPL-MCNC: 3.4 MG/DL — CRITICAL HIGH (ref 1.8–2.4)
MCHC RBC-ENTMCNC: 29.9 PG — SIGNIFICANT CHANGE UP (ref 27–31)
MCHC RBC-ENTMCNC: 33.3 G/DL — SIGNIFICANT CHANGE UP (ref 32–37)
MCV RBC AUTO: 89.8 FL — SIGNIFICANT CHANGE UP (ref 80–94)
MONOCYTES # BLD AUTO: 0.48 K/UL — SIGNIFICANT CHANGE UP (ref 0.1–0.6)
MONOCYTES NFR BLD AUTO: 4.4 % — SIGNIFICANT CHANGE UP (ref 1.7–9.3)
NEUTROPHILS # BLD AUTO: 9.64 K/UL — HIGH (ref 1.4–6.5)
NEUTROPHILS NFR BLD AUTO: 88.6 % — HIGH (ref 42.2–75.2)
NRBC # BLD: 0 /100 WBCS — SIGNIFICANT CHANGE UP (ref 0–0)
PCO2 BLDA: 47 MMHG — HIGH (ref 38–42)
PCO2 BLDA: 52 MMHG — HIGH (ref 38–42)
PH BLDA: 7.43 — HIGH (ref 7.38–7.42)
PH BLDA: 7.45 — HIGH (ref 7.38–7.42)
PLATELET # BLD AUTO: 357 K/UL — SIGNIFICANT CHANGE UP (ref 130–400)
PO2 BLDA: 113 MMHG — HIGH (ref 78–95)
PO2 BLDA: 96 MMHG — HIGH (ref 78–95)
POTASSIUM SERPL-MCNC: 4.8 MMOL/L — SIGNIFICANT CHANGE UP (ref 3.5–5)
POTASSIUM SERPL-MCNC: 5.3 MMOL/L — HIGH (ref 3.5–5)
POTASSIUM SERPL-SCNC: 4.8 MMOL/L — SIGNIFICANT CHANGE UP (ref 3.5–5)
POTASSIUM SERPL-SCNC: 5.3 MMOL/L — HIGH (ref 3.5–5)
RBC # BLD: 3.84 M/UL — LOW (ref 4.7–6.1)
RBC # FLD: 12.1 % — SIGNIFICANT CHANGE UP (ref 11.5–14.5)
SAO2 % BLDA: 98 % — SIGNIFICANT CHANGE UP (ref 94–98)
SAO2 % BLDA: 98 % — SIGNIFICANT CHANGE UP (ref 94–98)
SODIUM SERPL-SCNC: 143 MMOL/L — SIGNIFICANT CHANGE UP (ref 135–146)
SODIUM SERPL-SCNC: 143 MMOL/L — SIGNIFICANT CHANGE UP (ref 135–146)
WBC # BLD: 10.88 K/UL — HIGH (ref 4.8–10.8)
WBC # FLD AUTO: 10.88 K/UL — HIGH (ref 4.8–10.8)

## 2020-04-06 PROCEDURE — 99232 SBSQ HOSP IP/OBS MODERATE 35: CPT

## 2020-04-06 PROCEDURE — 71045 X-RAY EXAM CHEST 1 VIEW: CPT | Mod: 26

## 2020-04-06 RX ORDER — GLUCAGON INJECTION, SOLUTION 0.5 MG/.1ML
1 INJECTION, SOLUTION SUBCUTANEOUS ONCE
Refills: 0 | Status: DISCONTINUED | OUTPATIENT
Start: 2020-04-06 | End: 2020-04-12

## 2020-04-06 RX ORDER — INSULIN GLARGINE 100 [IU]/ML
20 INJECTION, SOLUTION SUBCUTANEOUS AT BEDTIME
Refills: 0 | Status: DISCONTINUED | OUTPATIENT
Start: 2020-04-06 | End: 2020-04-10

## 2020-04-06 RX ORDER — SODIUM CHLORIDE 9 MG/ML
1000 INJECTION, SOLUTION INTRAVENOUS
Refills: 0 | Status: DISCONTINUED | OUTPATIENT
Start: 2020-04-06 | End: 2020-04-10

## 2020-04-06 RX ORDER — DEXTROSE 50 % IN WATER 50 %
25 SYRINGE (ML) INTRAVENOUS ONCE
Refills: 0 | Status: DISCONTINUED | OUTPATIENT
Start: 2020-04-06 | End: 2020-04-12

## 2020-04-06 RX ORDER — INSULIN HUMAN 100 [IU]/ML
10 INJECTION, SOLUTION SUBCUTANEOUS ONCE
Refills: 0 | Status: COMPLETED | OUTPATIENT
Start: 2020-04-06 | End: 2020-04-06

## 2020-04-06 RX ORDER — DEXTROSE 50 % IN WATER 50 %
15 SYRINGE (ML) INTRAVENOUS ONCE
Refills: 0 | Status: DISCONTINUED | OUTPATIENT
Start: 2020-04-06 | End: 2020-04-12

## 2020-04-06 RX ORDER — INSULIN LISPRO 100/ML
5 VIAL (ML) SUBCUTANEOUS EVERY 6 HOURS
Refills: 0 | Status: DISCONTINUED | OUTPATIENT
Start: 2020-04-06 | End: 2020-04-10

## 2020-04-06 RX ORDER — DEXTROSE 50 % IN WATER 50 %
12.5 SYRINGE (ML) INTRAVENOUS ONCE
Refills: 0 | Status: DISCONTINUED | OUTPATIENT
Start: 2020-04-06 | End: 2020-04-12

## 2020-04-06 RX ORDER — SODIUM CHLORIDE 9 MG/ML
1000 INJECTION, SOLUTION INTRAVENOUS
Refills: 0 | Status: DISCONTINUED | OUTPATIENT
Start: 2020-04-06 | End: 2020-04-12

## 2020-04-06 RX ADMIN — Medication 5 UNIT(S): at 12:28

## 2020-04-06 RX ADMIN — Medication 1 ENEMA: at 16:24

## 2020-04-06 RX ADMIN — Medication 60 MILLIGRAM(S): at 17:30

## 2020-04-06 RX ADMIN — SODIUM CHLORIDE 60 MILLILITER(S): 9 INJECTION, SOLUTION INTRAVENOUS at 15:08

## 2020-04-06 RX ADMIN — Medication 10 MILLIGRAM(S): at 02:41

## 2020-04-06 RX ADMIN — Medication 5 UNIT(S): at 17:30

## 2020-04-06 RX ADMIN — Medication 5 UNIT(S): at 23:25

## 2020-04-06 RX ADMIN — CEFTRIAXONE 100 MILLIGRAM(S): 500 INJECTION, POWDER, FOR SOLUTION INTRAMUSCULAR; INTRAVENOUS at 16:24

## 2020-04-06 RX ADMIN — INSULIN GLARGINE 20 UNIT(S): 100 INJECTION, SOLUTION SUBCUTANEOUS at 21:53

## 2020-04-06 RX ADMIN — CHLORHEXIDINE GLUCONATE 1 APPLICATION(S): 213 SOLUTION TOPICAL at 05:06

## 2020-04-06 RX ADMIN — PANTOPRAZOLE SODIUM 40 MILLIGRAM(S): 20 TABLET, DELAYED RELEASE ORAL at 12:31

## 2020-04-06 RX ADMIN — INSULIN HUMAN 10 UNIT(S): 100 INJECTION, SOLUTION SUBCUTANEOUS at 06:51

## 2020-04-06 RX ADMIN — ENOXAPARIN SODIUM 40 MILLIGRAM(S): 100 INJECTION SUBCUTANEOUS at 21:53

## 2020-04-06 RX ADMIN — Medication 60 MILLIGRAM(S): at 05:06

## 2020-04-06 NOTE — PROGRESS NOTE ADULT - ASSESSMENT
56 y/o M from home with PMH of Psoriasis and no PSH presents with fever x 15 days to [facility], COVID 19 +, transferred to [facility] for bed availability, found to be hypoxic, requiring mechanical ventilation     Assessment:    Acute hypoxemic respiratory failure secondary to SARS COV 2  ARDS    PLAN:    CNS: Sedation with propofol, morphine gtt at 2 for pain, off pressors,     HEENT:  Oral care    PULMONARY:  HOB @ 45 degrees, Vent adjusted per pulm. MAP >60, s/p EPS monitor    CARDIOVASCULAR: , s/p monitor    GI: GI prophylaxis Protonix, tube feeding started    RENAL:  F/u  lytes.  Correct as needed. accurate I/O, f/u 8 pm BMP    INFECTIOUS DISEASE: IV abx  ceftriaxone,  Continue HCQ (day 4)  procalcitonin uptrend trend q48    HEMATOLOGICAL:  DVT prophylaxis.    ENDOCRINE:  Follow up FS.  Insulin protocol if needed. Solumederol day 3    MUSCULOSKELETAL: Bed rest for now    CODE STATUS: FULL CODE  MICU stay 56 y/o M from home with PMH of Psoriasis and no PSH presents with fever x 15 days to [facility], COVID 19 +, transferred to [facility] for bed availability, found to be hypoxic, requiring mechanical ventilation     Assessment:    Acute hypoxemic respiratory failure secondary to SARS COV 2  ARDS    PLAN:    CNS: SAT (was sedated w/ mophine and propofol)    HEENT:  Oral care    PULMONARY:  HOB @ 45 degrees, Vent adjusted per pulm.     CARDIOVASCULAR: , MAP >60, s/p EPS monitor removal on 4/5/2020    GI: GI prophylaxis Protonix, NPO until cleared by Sp and Sw s/p extubation    RENAL:  F/u  lytes.  Correct as needed. accurate I/O, f/u 8 pm BMP    INFECTIOUS DISEASE: c/w ceftriaxone (day 5), s/p HCQ (finished 4/5/2020), f/u BCx,   procalcitonin/CRP q48hrs    HEMATOLOGICAL:  DVT prophylaxis.    ENDOCRINE:  Elevated FS > Insulin protocol. c/w Solumedrol day 4    MUSCULOSKELETAL: Bed rest for now    CODE STATUS: FULL CODE  MICU stay

## 2020-04-06 NOTE — PROGRESS NOTE ADULT - SUBJECTIVE AND OBJECTIVE BOX
Patient Information:  FARHAD FINK / 55y / Male / MRN#:[id number]    Hospital Day: 6d    HPI:    Interval History:  Patient seen and examined at bedside. No acute events overnight.    Past Medical History:  Psoriasis  No pertinent past medical history    Past Surgical History:  No significant past surgical history    Allergies:  No Known Allergies    Medications:  PRN:  acetaminophen   Tablet .. 650 milliGRAM(s) Oral every 6 hours PRN Temp greater or equal to 38C (100.4F), Mild Pain (1 - 3)  bisacodyl Suppository 10 milliGRAM(s) Rectal daily PRN Constipation  dextrose 40% Gel 15 Gram(s) Oral once PRN Blood Glucose LESS THAN 70 milliGRAM(s)/deciliter  glucagon  Injectable 1 milliGRAM(s) IntraMuscular once PRN Glucose LESS THAN 70 milligrams/deciliter    Standing:  cefTRIAXone   IVPB 1000 milliGRAM(s) IV Intermittent every 24 hours  chlorhexidine 4% Liquid 1 Application(s) Topical <User Schedule>  dextrose 5%. 1000 milliLiter(s) (50 mL/Hr) IV Continuous <Continuous>  dextrose 50% Injectable 12.5 Gram(s) IV Push once  dextrose 50% Injectable 25 Gram(s) IV Push once  dextrose 50% Injectable 25 Gram(s) IV Push once  enoxaparin Injectable 40 milliGRAM(s) SubCutaneous at bedtime  insulin glargine Injectable (LANTUS) 20 Unit(s) SubCutaneous at bedtime  insulin lispro Injectable (HumaLOG) 5 Unit(s) SubCutaneous every 6 hours  lactulose Syrup 15 Gram(s) Oral daily  methylPREDNISolone sodium succinate Injectable 60 milliGRAM(s) IV Push two times a day  morphine  Infusion 1 mG/Hr (1 mL/Hr) IV Continuous <Continuous>  pantoprazole  Injectable 40 milliGRAM(s) IV Push daily  propofol Infusion 27.344 MICROgram(s)/kG/Min (16 mL/Hr) IV Continuous <Continuous>  senna 2 Tablet(s) Oral at bedtime    Home:    Vitals:  T(C): 37.1, Max: 37.5 (04-05-20 @ 15:46)  T(F): 98.7, Max: 99.5 (04-05-20 @ 15:46)  HR: 75 (70 - 91)  BP: 114/57 (105/58 - 117/58)  RR: 25 (22 - 26)  SpO2: 95% (95% - 96%)    Physical Exam:  General: Awake, Alert. Not in acute distress.  Heart: Regular rate and rhythm; S1, S2; No murmurs.  Lungs: Clear to auscultation bilaterally.  Abdomen: Soft, nontender, nondistended.  Extremities: No edema in upper or lower extremities.  Neuro: AAOx3, No focal deficits.    Labs:  CBC (04-06 @ 05:20)                        Hgb: 11.5   WBC: 10.88 )-----------------( Plts: 357                              Hct: 34.5     Chem (04-06 @ 07:12)  Na: 143  |     Cl: 105     |  BUN: 50  -----------------------------------------< Gluc: 402    K: 4.8   |    HCO3: 28  |  Cr: 0.9    Ca 8.0 (04-06 @ 07:12)  Mg 3.4 (04-06 @ 05:20)    LFTs (04-05 @ 04:30)  TPro 6.1  /  Alb 2.8  TBili 0.3  /  DBili     AST 34  /  ALT 59  /  AlkPhos 77      ABG (04-06 @ 03:33)  pH, Arterial: 7.44 pH, Blood: X /  pCO2: 45 /  pO2: 111 / HCO3: 31 / Base Excess: 5.9 /  SaO2: 98       Microbiology:    Radiology: Patient Information:  FARHAD FINK / 55y / Male / MRN#:[id number]    Hospital Day: 6d    HPI:    Interval History:  Patient seen and examined at bedside. No acute events overnight.    Past Medical History:  Psoriasis  No pertinent past medical history    Past Surgical History:  No significant past surgical history    Allergies:  No Known Allergies    Medications:  PRN:  acetaminophen   Tablet .. 650 milliGRAM(s) Oral every 6 hours PRN Temp greater or equal to 38C (100.4F), Mild Pain (1 - 3)  bisacodyl Suppository 10 milliGRAM(s) Rectal daily PRN Constipation  dextrose 40% Gel 15 Gram(s) Oral once PRN Blood Glucose LESS THAN 70 milliGRAM(s)/deciliter  glucagon  Injectable 1 milliGRAM(s) IntraMuscular once PRN Glucose LESS THAN 70 milligrams/deciliter    Standing:  cefTRIAXone   IVPB 1000 milliGRAM(s) IV Intermittent every 24 hours  chlorhexidine 4% Liquid 1 Application(s) Topical <User Schedule>  dextrose 5%. 1000 milliLiter(s) (50 mL/Hr) IV Continuous <Continuous>  dextrose 50% Injectable 12.5 Gram(s) IV Push once  dextrose 50% Injectable 25 Gram(s) IV Push once  dextrose 50% Injectable 25 Gram(s) IV Push once  enoxaparin Injectable 40 milliGRAM(s) SubCutaneous at bedtime  insulin glargine Injectable (LANTUS) 20 Unit(s) SubCutaneous at bedtime  insulin lispro Injectable (HumaLOG) 5 Unit(s) SubCutaneous every 6 hours  lactulose Syrup 15 Gram(s) Oral daily  methylPREDNISolone sodium succinate Injectable 60 milliGRAM(s) IV Push two times a day  morphine  Infusion 1 mG/Hr (1 mL/Hr) IV Continuous <Continuous>  pantoprazole  Injectable 40 milliGRAM(s) IV Push daily  propofol Infusion 27.344 MICROgram(s)/kG/Min (16 mL/Hr) IV Continuous <Continuous>  senna 2 Tablet(s) Oral at bedtime    Home:    Vitals:  T(C): 37.1, Max: 37.5 (04-05-20 @ 15:46)  T(F): 98.7, Max: 99.5 (04-05-20 @ 15:46)  HR: 75 (70 - 91)  BP: 114/57 (105/58 - 117/58)  RR: 25 (22 - 26)  SpO2: 95% (95% - 96%)    Physical Exam:  General: Awake, Alert. Not in acute distress.  Heart: Regular rate and rhythm; S1, S2; No murmurs.  Lungs: Clear to auscultation bilaterally.  Abdomen: Soft, nontender, nondistended.  Extremities: No edema in upper or lower extremities.  Neuro: No focal deficits. Following simple commands    Labs:  CBC (04-06 @ 05:20)                        Hgb: 11.5   WBC: 10.88 )-----------------( Plts: 357                              Hct: 34.5     Chem (04-06 @ 07:12)  Na: 143  |     Cl: 105     |  BUN: 50  -----------------------------------------< Gluc: 402    K: 4.8   |    HCO3: 28  |  Cr: 0.9    Ca 8.0 (04-06 @ 07:12)  Mg 3.4 (04-06 @ 05:20)    LFTs (04-05 @ 04:30)  TPro 6.1  /  Alb 2.8  TBili 0.3  /  DBili     AST 34  /  ALT 59  /  AlkPhos 77      ABG (04-06 @ 03:33)  pH, Arterial: 7.44 pH, Blood: X /  pCO2: 45 /  pO2: 111 / HCO3: 31 / Base Excess: 5.9 /  SaO2: 98       Microbiology:    Radiology:

## 2020-04-06 NOTE — PROGRESS NOTE ADULT - ASSESSMENT
Assessment:    Acute hypoxemic respiratory failure   SARS-COV-2 infection   ARDS    PLAN:    CNS: SAT    HEENT:  Oral care    PULMONARY:  HOB @ 45 degrees,  Peep 12. If tolerates, Decrease to 10.  Advance ET 2 cms if not extubated.  SBT    CARDIOVASCULAR:  i=O    GI: GI prophylaxis Protonix                                         Feeding:  Hold Tube feeds for SBT /  Bowel regimen     RENAL:  F/u  lytes.  Correct as needed. accurate I/O,     INFECTIOUS DISEASE: Finish Ceftriaxone course.      HEMATOLOGICAL:  DVT prophylaxis.    ENDOCRINE:  Follow up FS.  Insulin protocol if needed.  Solumedrol D#4    MUSCULOSKELETAL: Bed rest for now    CODE STATUS: FULL CODE    DISPOSITION: Patient require continued monitoring in MICU

## 2020-04-06 NOTE — PROGRESS NOTE ADULT - SUBJECTIVE AND OBJECTIVE BOX
FARHAD FINK  55y, Male  Allergy: No Known Allergies      LOS  6d    CHIEF COMPLAINT: arf (06 Apr 2020 11:35)      INTERVAL EVENTS/HPI  - T(F): , Max: 99.5 (04-05-20 @ 23:23)  - WBC Count: 10.88 (04-06-20 @ 05:20)  WBC Count: 14.72 (04-05-20 @ 04:30)  - Creatinine, Serum: 0.9 (04-06-20 @ 07:12)  Creatinine, Serum: 1.1 (04-06-20 @ 05:20)       ROS  unable to obtain history secondary to patient's mental status and/or sedation     VITALS:  T(F): 98, Max: 99.5 (04-05-20 @ 23:23)  HR: 68  BP: 129/69  RR: 20Vital Signs Last 24 Hrs  T(C): 36.7 (06 Apr 2020 16:00), Max: 37.5 (05 Apr 2020 23:23)  T(F): 98 (06 Apr 2020 16:00), Max: 99.5 (05 Apr 2020 23:23)  HR: 68 (06 Apr 2020 16:00) (68 - 91)  BP: 129/69 (06 Apr 2020 16:00) (114/57 - 129/69)  BP(mean): 93 (06 Apr 2020 16:00) (79 - 93)  RR: 20 (06 Apr 2020 16:00) (20 - 25)  SpO2: 95% (06 Apr 2020 08:10) (95% - 96%)    PHYSICAL EXAM:  General: intubated  HEENT: NCAT  CV: RRR  Lungs: symmetric chest expansion  Skin: no rash  Neuro: sedated  Lines     FH: Non-contributory  Social Hx: Non-contributory    TESTS & MEASUREMENTS:                        11.5   10.88 )-----------( 357      ( 06 Apr 2020 05:20 )             34.5     04-06    143  |  105  |  50<H>  ----------------------------<  402<H>  4.8   |  28  |  0.9    Ca    8.0<L>      06 Apr 2020 07:12  Mg     3.4     04-06    TPro  6.1  /  Alb  2.8<L>  /  TBili  0.3  /  DBili  x   /  AST  34  /  ALT  59<H>  /  AlkPhos  77  04-05    eGFR if Non African American: 94 mL/min/1.73M2 (04-06-20 @ 07:12)  eGFR if African American: 110 mL/min/1.73M2 (04-06-20 @ 07:12)  eGFR if Non African American: 74 mL/min/1.73M2 (04-06-20 @ 05:20)  eGFR if African American: 86 mL/min/1.73M2 (04-06-20 @ 05:20)    LIVER FUNCTIONS - ( 05 Apr 2020 04:30 )  Alb: 2.8 g/dL / Pro: 6.1 g/dL / ALK PHOS: 77 U/L / ALT: 59 U/L / AST: 34 U/L / GGT: x                     INFECTIOUS DISEASES TESTING  Procalcitonin, Serum: 0.46 (04-04-20 @ 06:59)  Procalcitonin, Serum: 0.38 (04-02-20 @ 22:00)  Procalcitonin, Serum: 0.21 (04-01-20 @ 07:21)  Procalcitonin, Serum: 0.11 (03-31-20 @ 17:57)  COVID-19 PCR: Detected (03-31-20 @ 09:00)  Rapid RVP Result: NotDetec (03-31-20 @ 09:00)  Flu A Result: NotDetec (03-31-20 @ 02:07)  Flu B Result: NotDetec (03-31-20 @ 02:07)  RSV Result: NotDetec (03-31-20 @ 02:07)      INFLAMMATORY MARKERS  C-Reactive Protein, Serum: 24.60 mg/dL (04-04-20 @ 06:59)  C-Reactive Protein, Serum: 30.51 mg/dL (04-02-20 @ 22:00)  C-Reactive Protein, Serum: 24.26 mg/dL (04-01-20 @ 07:21)  C-Reactive Protein, Serum: 13.94 mg/dL (03-31-20 @ 17:57)      RADIOLOGY & ADDITIONAL TESTS:  I have personally reviewed the last available Chest xray  CXR  Xray Chest 1 View- PORTABLE-Urgent:   EXAM:  XR CHEST PORTABLE URGENT 1V            PROCEDURE DATE:  04/05/2020            INTERPRETATION:  Clinical History / Reason for exam: ogt    Comparison : Chest radiograph  4/5/2020    Technique/Positioning: Frontal view of the chest    Findings:    Support devices: There is an endotracheal tube in appropriate position. There is an enteric tube coursing below the diaphragm. There is a right internal jugular central venous catheter.    Cardiac/mediastinum/hilum: Stable.    Lung parenchyma/Pleura: There are bilateral opacities, unchanged on the right and decreased on the left. No evidence of pneumothorax or pleural effusion.    Skeleton/soft tissues: Stable    Impression:      Bilateral opacities, unchanged on the right and decreased on the left.    Lines and support devices as described above.                      TERESA PENA M.D., ATTENDING RADIOLOGIST  This document has been electronically signed. Apr 5 2020 11:18AM             (04-05-20 @ 09:48)      CT      CARDIOLOGY TESTING  12 Lead ECG:   Ventricular Rate 84 BPM    Atrial Rate 84 BPM    P-R Interval 166 ms    QRS Duration 116 ms    Q-T Interval 362 ms    QTC Calculation(Bezet) 427 ms    P Axis 39 degrees    R Axis -29 degrees    T Axis 12 degrees    Diagnosis Line Normal sinus rhythm  Left axis deviation  Borderline EKG      Confirmed by ISIDORO ROSEN MD ([id number]) on 4/2/2020 10:59:02 PM (04-01-20 @ 11:22)  12 Lead ECG:   Ventricular Rate 98 BPM    Atrial Rate 98 BPM    P-R Interval 152 ms    QRS Duration 108 ms    Q-T Interval 344 ms    QTC Calculation(Bezet) 439 ms    P Axis 35 degrees    R Axis -42 degrees    T Axis 22 degrees    Diagnosis Line Normal sinus rhythm  Left axis deviation  Abnormal ECG    Confirmed by EVERARDO RAGSDALE [facility] ([id number]) on 3/31/2020 11:39:32 AM (03-31-20 @ 00:46)      MEDICATIONS  chlorhexidine 4% Liquid 1  dextrose 5%. 1000  dextrose 50% Injectable 12.5  dextrose 50% Injectable 25  dextrose 50% Injectable 25  enoxaparin Injectable 40  insulin glargine Injectable (LANTUS) 20  insulin lispro Injectable (HumaLOG) 5  lactated ringers. 1000  lactulose Syrup 15  methylPREDNISolone sodium succinate Injectable 60  pantoprazole  Injectable 40  senna 2      WEIGHT  Weight (kg): 97.522 (04-02-20 @ 11:17)  Creatinine, Serum: 0.9 mg/dL (04-06-20 @ 07:12)  Creatinine, Serum: 1.1 mg/dL (04-06-20 @ 05:20)      ANTIBIOTICS:      All available historical records have been reviewed

## 2020-04-06 NOTE — PROGRESS NOTE ADULT - ASSESSMENT
54 y/o man with PMH of Psoriasis and not on immunosuppression presents with fever x 15 days along with chills, cough, dyspnea and myalgias. His symptoms are progressively worsening. He stated his Fever was 104F yesterday and was relieved with Tylenol.  His wife is COVID positive and she is sick.  He was transferred from [facility].    IMPRESSION:  COVID-19 Pneumonia, septic shock requiring pressors , intubated  Pt with Hypoxemia O2 <93% and CXR with b/l opacities   -significant elevation of inflammatory markers with possible progression to cytokine storm  Procalcitonin, Serum: 0.46 (04-04-20 @ 06:59)  #Cytokine Release Syndrome   D-Dimer Assay, Quantitative: 8078 ng/mL DDU (04.02.20 @ 22:00)      RECOMMENDATIONS;  - D-dimer >1000, if no contraindications, would highly consider therapeutic lovenox (GFR >30, 1mg/kg BID; GFR <30 1mg/kg daily) , now on daily dosing   s/p plaquenilweston

## 2020-04-06 NOTE — PROGRESS NOTE ADULT - SUBJECTIVE AND OBJECTIVE BOX
Patient is a 55y old  Male who presents with a chief complaint of arf (05 Apr 2020 11:50)        Over Night Events:  Sedated.  Off pressors         ROS:     All ROS are negative except HPI         PHYSICAL EXAM    ICU Vital Signs Last 24 Hrs  T(C): 37.1 (06 Apr 2020 08:07), Max: 37.5 (05 Apr 2020 15:46)  T(F): 98.7 (06 Apr 2020 08:07), Max: 99.5 (05 Apr 2020 15:46)  HR: 75 (06 Apr 2020 08:07) (75 - 91)  BP: 114/57 (06 Apr 2020 08:07) (105/58 - 117/58)  BP(mean): 79 (06 Apr 2020 08:07) (79 - 80)  ABP: --  ABP(mean): --  RR: 25 (06 Apr 2020 08:07) (22 - 26)  SpO2: 95% (06 Apr 2020 08:07) (95% - 96%)      CONSTITUTIONAL:  Well nourished.  NAD    ENT:   Airway patent,   Mouth with normal mucosa.   No thrush    EYES:   Pupils equal,   Round and reactive to light.    CARDIAC:   Normal rate,   Regular rhythm.    No edema      Vascular:  Normal systolic impulse  No Carotid bruits    RESPIRATORY:   No wheezing  Bilateral BS  Normal chest expansion  Not tachypneic,  No use of accessory muscles    GASTROINTESTINAL:  Abdomen soft,   Non-tender,   No guarding,   + BS    GENITOURINARY  normal genitalia for sex  no edema    MUSCULOSKELETAL:   Range of motion is not limited,  No clubbing, cyanosis    NEUROLOGICAL:   Alert   Follows commands     SKIN:   Skin normal color for race,   Warm and dry and intact.   No evidence of rash.    PSYCHIATRIC:   Normal mood and affect.   No apparent risk to self or others.    HEMATOLOGICAL:  No cervical  lymphadenopathy.  no inguinal lymphadenopathy      04-05-20 @ 07:01  -  04-06-20 @ 07:00  --------------------------------------------------------  IN:    Enteral Tube Flush: 260 mL    IV PiggyBack: 50 mL    morphine  Infusion: 31 mL    ns in tub fed  [id number]: 940 mL    propofol Infusion: 330 mL  Total IN: 1611 mL    OUT:    Indwelling Catheter - Urethral: 1150 mL  Total OUT: 1150 mL    Total NET: 461 mL      04-06-20 @ 07:01  -  04-06-20 @ 11:38  --------------------------------------------------------  IN:    morphine  Infusion: 4 mL    propofol Infusion: 28 mL  Total IN: 32 mL    OUT:  Total OUT: 0 mL    Total NET: 32 mL          LABS:                            11.5   10.88 )-----------( 357      ( 06 Apr 2020 05:20 )             34.5                                               04-06    143  |  105  |  50<H>  ----------------------------<  402<H>  4.8   |  28  |  0.9    Ca    8.0<L>      06 Apr 2020 07:12  Mg     3.4     04-06    TPro  6.1  /  Alb  2.8<L>  /  TBili  0.3  /  DBili  x   /  AST  34  /  ALT  59<H>  /  AlkPhos  77  04-05                                                                                           LIVER FUNCTIONS - ( 05 Apr 2020 04:30 )  Alb: 2.8 g/dL / Pro: 6.1 g/dL / ALK PHOS: 77 U/L / ALT: 59 U/L / AST: 34 U/L / GGT: x                                                                                               Mode: AC/ CMV (Assist Control/ Continuous Mandatory Ventilation)  RR (machine): 23  TV (machine): 420  FiO2: 35  PEEP: 12  ITime: 1  MAP: 19  PIP: 28                                      ABG - ( 06 Apr 2020 03:33 )  pH, Arterial: 7.44  pH, Blood: x     /  pCO2: 45    /  pO2: 111   / HCO3: 31    / Base Excess: 5.9   /  SaO2: 98                  MEDICATIONS  (STANDING):  cefTRIAXone   IVPB 1000 milliGRAM(s) IV Intermittent every 24 hours  chlorhexidine 4% Liquid 1 Application(s) Topical <User Schedule>  dextrose 5%. 1000 milliLiter(s) (50 mL/Hr) IV Continuous <Continuous>  dextrose 50% Injectable 12.5 Gram(s) IV Push once  dextrose 50% Injectable 25 Gram(s) IV Push once  dextrose 50% Injectable 25 Gram(s) IV Push once  enoxaparin Injectable 40 milliGRAM(s) SubCutaneous at bedtime  insulin glargine Injectable (LANTUS) 20 Unit(s) SubCutaneous at bedtime  insulin lispro Injectable (HumaLOG) 5 Unit(s) SubCutaneous every 6 hours  lactulose Syrup 15 Gram(s) Oral daily  methylPREDNISolone sodium succinate Injectable 60 milliGRAM(s) IV Push two times a day  morphine  Infusion 1 mG/Hr (1 mL/Hr) IV Continuous <Continuous>  pantoprazole  Injectable 40 milliGRAM(s) IV Push daily  propofol Infusion 27.344 MICROgram(s)/kG/Min (16 mL/Hr) IV Continuous <Continuous>  senna 2 Tablet(s) Oral at bedtime    MEDICATIONS  (PRN):  acetaminophen   Tablet .. 650 milliGRAM(s) Oral every 6 hours PRN Temp greater or equal to 38C (100.4F), Mild Pain (1 - 3)  bisacodyl Suppository 10 milliGRAM(s) Rectal daily PRN Constipation  dextrose 40% Gel 15 Gram(s) Oral once PRN Blood Glucose LESS THAN 70 milliGRAM(s)/deciliter  glucagon  Injectable 1 milliGRAM(s) IntraMuscular once PRN Glucose LESS THAN 70 milligrams/deciliter      New X-rays reviewed:                                                                                  ECHO    CXR interpreted by me:   ET high  OG OK.  Improved infiltrates

## 2020-04-07 LAB
ALBUMIN SERPL ELPH-MCNC: 2.3 G/DL — LOW (ref 3.5–5.2)
ALP SERPL-CCNC: 56 U/L — SIGNIFICANT CHANGE UP (ref 30–115)
ALT FLD-CCNC: 63 U/L — HIGH (ref 0–41)
ANION GAP SERPL CALC-SCNC: 9 MMOL/L — SIGNIFICANT CHANGE UP (ref 7–14)
AST SERPL-CCNC: 53 U/L — HIGH (ref 0–41)
BASOPHILS # BLD AUTO: 0.01 K/UL — SIGNIFICANT CHANGE UP (ref 0–0.2)
BASOPHILS NFR BLD AUTO: 0.1 % — SIGNIFICANT CHANGE UP (ref 0–1)
BILIRUB DIRECT SERPL-MCNC: 0.3 MG/DL — HIGH (ref 0–0.2)
BILIRUB INDIRECT FLD-MCNC: 0.2 MG/DL — SIGNIFICANT CHANGE UP (ref 0.2–1.2)
BILIRUB SERPL-MCNC: 0.5 MG/DL — SIGNIFICANT CHANGE UP (ref 0.2–1.2)
BUN SERPL-MCNC: 27 MG/DL — HIGH (ref 10–20)
CALCIUM SERPL-MCNC: 6.6 MG/DL — LOW (ref 8.5–10.1)
CHLORIDE SERPL-SCNC: 114 MMOL/L — HIGH (ref 98–110)
CO2 SERPL-SCNC: 27 MMOL/L — SIGNIFICANT CHANGE UP (ref 17–32)
CREAT SERPL-MCNC: 0.5 MG/DL — LOW (ref 0.7–1.5)
EOSINOPHIL # BLD AUTO: 0 K/UL — SIGNIFICANT CHANGE UP (ref 0–0.7)
EOSINOPHIL NFR BLD AUTO: 0 % — SIGNIFICANT CHANGE UP (ref 0–8)
GLUCOSE BLDC GLUCOMTR-MCNC: 192 MG/DL — HIGH (ref 70–99)
GLUCOSE BLDC GLUCOMTR-MCNC: 198 MG/DL — HIGH (ref 70–99)
GLUCOSE BLDC GLUCOMTR-MCNC: 228 MG/DL — HIGH (ref 70–99)
GLUCOSE BLDC GLUCOMTR-MCNC: 230 MG/DL — HIGH (ref 70–99)
GLUCOSE SERPL-MCNC: 194 MG/DL — HIGH (ref 70–99)
HCT VFR BLD CALC: 36.6 % — LOW (ref 42–52)
HGB BLD-MCNC: 12.4 G/DL — LOW (ref 14–18)
IMM GRANULOCYTES NFR BLD AUTO: 0.6 % — HIGH (ref 0.1–0.3)
LYMPHOCYTES # BLD AUTO: 0.52 K/UL — LOW (ref 1.2–3.4)
LYMPHOCYTES # BLD AUTO: 4.8 % — LOW (ref 20.5–51.1)
MAGNESIUM SERPL-MCNC: 2.2 MG/DL — SIGNIFICANT CHANGE UP (ref 1.8–2.4)
MCHC RBC-ENTMCNC: 30.3 PG — SIGNIFICANT CHANGE UP (ref 27–31)
MCHC RBC-ENTMCNC: 33.9 G/DL — SIGNIFICANT CHANGE UP (ref 32–37)
MCV RBC AUTO: 89.5 FL — SIGNIFICANT CHANGE UP (ref 80–94)
MONOCYTES # BLD AUTO: 0.39 K/UL — SIGNIFICANT CHANGE UP (ref 0.1–0.6)
MONOCYTES NFR BLD AUTO: 3.6 % — SIGNIFICANT CHANGE UP (ref 1.7–9.3)
NEUTROPHILS # BLD AUTO: 9.85 K/UL — HIGH (ref 1.4–6.5)
NEUTROPHILS NFR BLD AUTO: 90.9 % — HIGH (ref 42.2–75.2)
NRBC # BLD: 0 /100 WBCS — SIGNIFICANT CHANGE UP (ref 0–0)
PLATELET # BLD AUTO: 372 K/UL — SIGNIFICANT CHANGE UP (ref 130–400)
POTASSIUM SERPL-MCNC: 4.2 MMOL/L — SIGNIFICANT CHANGE UP (ref 3.5–5)
POTASSIUM SERPL-SCNC: 4.2 MMOL/L — SIGNIFICANT CHANGE UP (ref 3.5–5)
PROT SERPL-MCNC: 5 G/DL — LOW (ref 6–8)
RBC # BLD: 4.09 M/UL — LOW (ref 4.7–6.1)
RBC # FLD: 11.9 % — SIGNIFICANT CHANGE UP (ref 11.5–14.5)
SODIUM SERPL-SCNC: 150 MMOL/L — HIGH (ref 135–146)
WBC # BLD: 10.84 K/UL — HIGH (ref 4.8–10.8)
WBC # FLD AUTO: 10.84 K/UL — HIGH (ref 4.8–10.8)

## 2020-04-07 PROCEDURE — 99232 SBSQ HOSP IP/OBS MODERATE 35: CPT

## 2020-04-07 PROCEDURE — 71045 X-RAY EXAM CHEST 1 VIEW: CPT | Mod: 26

## 2020-04-07 RX ORDER — SODIUM CHLORIDE 9 MG/ML
1000 INJECTION, SOLUTION INTRAVENOUS
Refills: 0 | Status: DISCONTINUED | OUTPATIENT
Start: 2020-04-07 | End: 2020-04-09

## 2020-04-07 RX ADMIN — Medication 60 MILLIGRAM(S): at 04:50

## 2020-04-07 RX ADMIN — ENOXAPARIN SODIUM 40 MILLIGRAM(S): 100 INJECTION SUBCUTANEOUS at 21:21

## 2020-04-07 RX ADMIN — PANTOPRAZOLE SODIUM 40 MILLIGRAM(S): 20 TABLET, DELAYED RELEASE ORAL at 12:34

## 2020-04-07 RX ADMIN — CHLORHEXIDINE GLUCONATE 1 APPLICATION(S): 213 SOLUTION TOPICAL at 04:50

## 2020-04-07 RX ADMIN — Medication 60 MILLIGRAM(S): at 17:20

## 2020-04-07 RX ADMIN — Medication 5 UNIT(S): at 12:34

## 2020-04-07 NOTE — PROGRESS NOTE ADULT - ASSESSMENT
54 y/o man with PMH of Psoriasis and not on immunosuppression presents with fever x 15 days along with chills, cough, dyspnea and myalgias. His symptoms are progressively worsening. He stated his Fever was 104F yesterday and was relieved with Tylenol.  His wife is COVID positive and she is sick.  He was transferred from [facility].    IMPRESSION:  COVID-19 Pneumonia, septic shock requiring pressors , intubated  s/p plaquenil, azithro    Pt with Hypoxemia O2 <93% and CXR with b/l opacities   -significant elevation of inflammatory markers with possible progression to cytokine storm  Procalcitonin, Serum: 0.46 (04-04-20 @ 06:59)  #Cytokine Release Syndrome   D-Dimer Assay, Quantitative: 8078 ng/mL DDU (04.02.20 @ 22:00)  On  lovenox (      RECOMMENDATIONS;  Continue lovenox

## 2020-04-07 NOTE — PROGRESS NOTE ADULT - ASSESSMENT
Assessment:    Acute hypoxemic respiratory failure   SARS-COV-2 infection   ARDS    PLAN:    CNS: Avoid sedatives     HEENT:  Oral care    PULMONARY:  HOB @ 45 degrees, Taper FIo2     CARDIOVASCULAR:  i=O    GI: GI prophylaxis Protonix                                         Feeding:  speech and swallow when off NRB     RENAL:  F/u  lytes.  Correct as needed. accurate I/O,     INFECTIOUS DISEASE: s/p abx    HEMATOLOGICAL:  DVT prophylaxis.    ENDOCRINE:  Follow up FS.  Insulin protocol if needed.  Solumedrol D#5    MUSCULOSKELETAL: Bed rest for now    CODE STATUS: FULL CODE    DISPOSITION: Patient require continued monitoring in MICU

## 2020-04-07 NOTE — CHART NOTE - NSCHARTNOTEFT_GEN_A_CORE
Registered Dietitian Follow-Up     Patient Profile Reviewed                           Yes [x]   No []     Nutrition History Previously Obtained        Yes []  No [x]--unable to obtain as pt and emergency contact unreachable via phone at this time        Pertinent Medical Interventions: Pt s/p extubation on 4/6, currently on NRB > titrate down on O2.   CXR this AM showing stable b/l opacities. NPO until cleared by Sp and Sw s/p extubation (can not while on NRB).     Diet order: NPO     Anthropometrics:  - Ht. 63"  - Wt. 215# (4/2)--no new wts   - %wt change  - BMI: 38.0  - IBW: 124#      Pertinent Lab Data: (4/7): H/H 12.4/36.6, POCT 230, Na 150, BUN 27, Cr. 0.5, Gluc 194, elevated LFTs      Pertinent Meds: lovenox, D5W @ 75ml/hr (additional 255kcal), lactated ringers, insulin, dulcoalx, methylprednisolone, lactulose, senna, protonix      Physical Findings:  - Appearance: alert and oriented; 1+ generalized edema noted   - GI function: LBM 4/6   - Oral/Mouth cavity: per SLP recs on 4/7--pt not appropriate for PO intake at this time as he is on o2 via Non-rebreather. Rec NPO with alternate means for hydration/nutrition. Reconsult when Pt is tolerating NC.  - Skin: intact     Nutrition Requirements  Weight Used: IBW: 124#/56kg; needs readjusted today s/p extubation      Estimated Energy Needs: 8721-5462 kcal/day (25-30 kcal/kg IBW)--obese BMI considered    Estimated Protein Needs: 56-67 gm/day (1-1.2 gm/kg IBW)--same as above   Estimated Fluid Needs: per VENT team      Nutrient Intake: not meeting kcal/pro needs d/t NPO status      Previous Nutrition Diagnosis: Inadequate protein intake (ongoing)     Nutrition Intervention: collaboration of care, meals and snacks     Recommendation:  1. F/u SLP, diet order to be consistent with SLP recs; upon diet advancement, add CHO Consistent/HS diet modifier     Goal/Expected Outcome: Pt's diet to be advanced and pt to consume >50% po intake of meals and snacks within 3 days      Indicator/Monitoring: RD to monitor diet order, energy intake, body composition, glucose/renal/liver profiles, nutrition focused physical findings

## 2020-04-07 NOTE — PROGRESS NOTE ADULT - ASSESSMENT
56 y/o M from home with PMH of Psoriasis and no PSH presents with fever x 15 days to [facility], COVID 19 +, transferred to [facility] for bed availability, found to be hypoxic, requiring mechanical ventilation     Assessment:    Acute hypoxemic respiratory failure secondary to SARS COV 2  ARDS    PLAN:    CNS: SAT (was sedated w/ mophine and propofol)    HEENT:  Oral care    PULMONARY:  HOB @ 45 degrees, Vent adjusted per pulm.     CARDIOVASCULAR: , MAP >60, s/p EPS monitor removal on 4/5/2020    GI: GI prophylaxis Protonix, NPO until cleared by Sp and Sw s/p extubation    RENAL:  F/u  lytes.  Correct as needed. accurate I/O, f/u 8 pm BMP    INFECTIOUS DISEASE: c/w ceftriaxone (day 5), s/p HCQ (finished 4/5/2020), f/u BCx,   procalcitonin/CRP q48hrs    HEMATOLOGICAL:  DVT prophylaxis.    ENDOCRINE:  Elevated FS > Insulin protocol. c/w Solumedrol day 4    MUSCULOSKELETAL: Bed rest for now    CODE STATUS: FULL CODE  MICU stay 56 y/o M from home with PMH of Psoriasis and no PSH presents with fever x 15 days to [facility], COVID 19 +, transferred to [facility] for bed availability, found to be hypoxic, requiring mechanical ventilation. S/p extubation 4/6/2020    Assessment:    Acute hypoxemic respiratory failure secondary to SARS COV 2 PNA  ARDS    PLAN:    CNS: avoid sedatives    HEENT:  Oral care    PULMONARY:  HOB @ 45 degrees, s/p extubation on 4/6/2020, currently on NRB > titrate down on O2.   CXR this AM showing stable b/l opacities    CARDIOVASCULAR: , MAP >60, s/p EPS monitor removal on 4/5/2020    GI: GI prophylaxis Protonix, NPO until cleared by Sp and Sw s/p extubation (can not while on NRB), Bowel regimen    RENAL:  F/u  lytes.  Correct as needed. accurate I/O, f/u 8 pm BMP    INFECTIOUS DISEASE: s/p ceftriaxone (finished 4/6/2020), s/p HCQ (finished 4/5/2020), f/u BCx,   procalcitonin/CRP q48hrs    HEMATOLOGICAL:  DVT prophylaxis.    ENDOCRINE:  Elevated FS > Insulin protocol. c/w Solumedrol day 5    MUSCULOSKELETAL: Bed rest for now    CODE STATUS: FULL CODE  MICU stay

## 2020-04-07 NOTE — PROGRESS NOTE ADULT - SUBJECTIVE AND OBJECTIVE BOX
Patient is a 55y old  Male who presents with a chief complaint of COVID-19 Pneumonia (07 Apr 2020 08:47)    Over Night Events:    Extubated yesterday. No events. Off all drips.       ROS: -ve other than HPI        PHYSICAL EXAM    ICU Vital Signs Last 24 Hrs  T(C): 37 (07 Apr 2020 08:00), Max: 37 (07 Apr 2020 08:00)  T(F): 98.6 (07 Apr 2020 08:00), Max: 98.6 (07 Apr 2020 08:00)  HR: 54 (07 Apr 2020 08:00) (54 - 71)  BP: 138/74 (07 Apr 2020 08:00) (129/69 - 166/92)  BP(mean): 100 (07 Apr 2020 08:00) (93 - 123)  RR: 20 (07 Apr 2020 08:00) (20 - 20)  SpO2: --      CONSTITUTIONAL:   Ill appearing.  Well nourished.  NAD    ENT:   Airway patent,   Mouth with normal mucosa.   No thrush    CARDIAC:   Normal rate,   Regular rhythm.     No edema      RESPIRATORY:   bilateral rales   NO wheezing  Bilateral BS  Normal chest expansion  Not tachypneic,  No use of accessory muscles    GASTROINTESTINAL:  Abdomen soft,   Non-tender,   No guarding,   + BS    MUSCULOSKELETAL:   Range of motion is not limited,  No clubbing, cyanosis    NEUROLOGICAL:   Alert and oriented   No motor  deficits.  pertinent DTRs normal    SKIN:   Skin normal color for race,   warm, dry   No evidence of rash.        04-06-20 @ 07:01  -  04-07-20 @ 07:00  --------------------------------------------------------  IN:    IV PiggyBack: 50 mL    lactated ringers.: 1020 mL    morphine  Infusion: 4 mL    propofol Infusion: 28 mL  Total IN: 1102 mL    OUT:    Indwelling Catheter - Urethral: 2550 mL  Total OUT: 2550 mL    Total NET: -1448 mL      04-07-20 @ 07:01  -  04-07-20 @ 12:13  --------------------------------------------------------  IN:    lactated ringers.: 180 mL  Total IN: 180 mL    OUT:  Total OUT: 0 mL    Total NET: 180 mL      LABS:                            12.4   10.84 )-----------( 372      ( 07 Apr 2020 08:24 )             36.6                                               04-07    150<H>  |  114<H>  |  27<H>  ----------------------------<  194<H>  4.2   |  27  |  0.5<L>    Ca    6.6<L>      07 Apr 2020 08:24  Mg     2.2     04-07    TPro  5.0<L>  /  Alb  2.3<L>  /  TBili  0.5  /  DBili  0.3<H>  /  AST  53<H>  /  ALT  63<H>  /  AlkPhos  56  04-07                                            LIVER FUNCTIONS - ( 07 Apr 2020 08:24 )  Alb: 2.3 g/dL / Pro: 5.0 g/dL / ALK PHOS: 56 U/L / ALT: 63 U/L / AST: 53 U/L / GGT: x                                                                                  ABG - ( 06 Apr 2020 17:02 )  pH, Arterial: 7.43  pH, Blood: x     /  pCO2: 52    /  pO2: 113   / HCO3: 35    / Base Excess: 8.1   /  SaO2: 98          MEDICATIONS  (STANDING):  chlorhexidine 4% Liquid 1 Application(s) Topical <User Schedule>  dextrose 5%. 1000 milliLiter(s) (50 mL/Hr) IV Continuous <Continuous>  dextrose 50% Injectable 12.5 Gram(s) IV Push once  dextrose 50% Injectable 25 Gram(s) IV Push once  dextrose 50% Injectable 25 Gram(s) IV Push once  enoxaparin Injectable 40 milliGRAM(s) SubCutaneous at bedtime  insulin glargine Injectable (LANTUS) 20 Unit(s) SubCutaneous at bedtime  insulin lispro Injectable (HumaLOG) 5 Unit(s) SubCutaneous every 6 hours  lactated ringers. 1000 milliLiter(s) (60 mL/Hr) IV Continuous <Continuous>  lactulose Syrup 15 Gram(s) Oral daily  methylPREDNISolone sodium succinate Injectable 60 milliGRAM(s) IV Push two times a day  pantoprazole  Injectable 40 milliGRAM(s) IV Push daily  senna 2 Tablet(s) Oral at bedtime    MEDICATIONS  (PRN):  acetaminophen   Tablet .. 650 milliGRAM(s) Oral every 6 hours PRN Temp greater or equal to 38C (100.4F), Mild Pain (1 - 3)  bisacodyl Suppository 10 milliGRAM(s) Rectal daily PRN Constipation  dextrose 40% Gel 15 Gram(s) Oral once PRN Blood Glucose LESS THAN 70 milliGRAM(s)/deciliter  glucagon  Injectable 1 milliGRAM(s) IntraMuscular once PRN Glucose LESS THAN 70 milligrams/deciliter      New X-rays reviewed:      Stable bilateral opacities                                                                             ECHO    CXR interpreted by me:

## 2020-04-07 NOTE — PROGRESS NOTE ADULT - SUBJECTIVE AND OBJECTIVE BOX
Patient Information:  FARHAD FINK / 55y / Male / MRN#:[id number]    Hospital Day: 7d    HPI:    Interval History:  Patient seen and examined at bedside. No acute events overnight.    Past Medical History:  Psoriasis  No pertinent past medical history    Past Surgical History:  No significant past surgical history    Allergies:  No Known Allergies    Medications:  PRN:  acetaminophen   Tablet .. 650 milliGRAM(s) Oral every 6 hours PRN Temp greater or equal to 38C (100.4F), Mild Pain (1 - 3)  bisacodyl Suppository 10 milliGRAM(s) Rectal daily PRN Constipation  dextrose 40% Gel 15 Gram(s) Oral once PRN Blood Glucose LESS THAN 70 milliGRAM(s)/deciliter  glucagon  Injectable 1 milliGRAM(s) IntraMuscular once PRN Glucose LESS THAN 70 milligrams/deciliter    Standing:  chlorhexidine 4% Liquid 1 Application(s) Topical <User Schedule>  dextrose 5%. 1000 milliLiter(s) (50 mL/Hr) IV Continuous <Continuous>  dextrose 50% Injectable 12.5 Gram(s) IV Push once  dextrose 50% Injectable 25 Gram(s) IV Push once  dextrose 50% Injectable 25 Gram(s) IV Push once  enoxaparin Injectable 40 milliGRAM(s) SubCutaneous at bedtime  insulin glargine Injectable (LANTUS) 20 Unit(s) SubCutaneous at bedtime  insulin lispro Injectable (HumaLOG) 5 Unit(s) SubCutaneous every 6 hours  lactated ringers. 1000 milliLiter(s) (60 mL/Hr) IV Continuous <Continuous>  lactulose Syrup 15 Gram(s) Oral daily  methylPREDNISolone sodium succinate Injectable 60 milliGRAM(s) IV Push two times a day  pantoprazole  Injectable 40 milliGRAM(s) IV Push daily  senna 2 Tablet(s) Oral at bedtime    Home:    Vitals:  T(C): 36.8, Max: 36.8 (04-06-20 @ 20:00)  T(F): 98.3, Max: 98.3 (04-06-20 @ 23:25)  HR: 58 (57 - 71)  BP: 166/80 (129/69 - 166/92)  RR: 20 (20 - 20)  SpO2: --    Physical Exam:  General: Awake, Alert. Not in acute distress.  Heart: Regular rate and rhythm; S1, S2; No murmurs.  Lungs: Clear to auscultation bilaterally.  Abdomen: Soft, nontender, nondistended.  Extremities: No edema in upper or lower extremities.  Neuro: AAOx3, No focal deficits.    Labs:  CBC (04-06 @ 05:20)                        Hgb: 11.5   WBC: 10.88 )-----------------( Plts: 357                              Hct: 34.5     Chem (04-06 @ 07:12)  Na: 143  |     Cl: 105     |  BUN: 50  -----------------------------------------< Gluc: 402    K: 4.8   |    HCO3: 28  |  Cr: 0.9    Ca 8.0 (04-06 @ 07:12)  Mg 3.4 (04-06 @ 05:20)        ABG (04-06 @ 17:02)  pH, Arterial: 7.43 pH, Blood: X /  pCO2: 52 /  pO2: 113 / HCO3: 35 / Base Excess: 8.1 /  SaO2: 98       Microbiology:    Radiology: Patient Information:  FARHAD FINK / 55y / Male / MRN#:[id number]    Hospital Day: 7d    HPI:    Interval History:  Patient seen and examined at bedside. No acute events overnight.    Past Medical History:  Psoriasis  No pertinent past medical history    Past Surgical History:  No significant past surgical history    Allergies:  No Known Allergies    Medications:  PRN:  acetaminophen   Tablet .. 650 milliGRAM(s) Oral every 6 hours PRN Temp greater or equal to 38C (100.4F), Mild Pain (1 - 3)  bisacodyl Suppository 10 milliGRAM(s) Rectal daily PRN Constipation  dextrose 40% Gel 15 Gram(s) Oral once PRN Blood Glucose LESS THAN 70 milliGRAM(s)/deciliter  glucagon  Injectable 1 milliGRAM(s) IntraMuscular once PRN Glucose LESS THAN 70 milligrams/deciliter    Standing:  chlorhexidine 4% Liquid 1 Application(s) Topical <User Schedule>  dextrose 5%. 1000 milliLiter(s) (50 mL/Hr) IV Continuous <Continuous>  dextrose 50% Injectable 12.5 Gram(s) IV Push once  dextrose 50% Injectable 25 Gram(s) IV Push once  dextrose 50% Injectable 25 Gram(s) IV Push once  enoxaparin Injectable 40 milliGRAM(s) SubCutaneous at bedtime  insulin glargine Injectable (LANTUS) 20 Unit(s) SubCutaneous at bedtime  insulin lispro Injectable (HumaLOG) 5 Unit(s) SubCutaneous every 6 hours  lactated ringers. 1000 milliLiter(s) (60 mL/Hr) IV Continuous <Continuous>  lactulose Syrup 15 Gram(s) Oral daily  methylPREDNISolone sodium succinate Injectable 60 milliGRAM(s) IV Push two times a day  pantoprazole  Injectable 40 milliGRAM(s) IV Push daily  senna 2 Tablet(s) Oral at bedtime    Home:    Vitals:  T(C): 36.8, Max: 36.8 (04-06-20 @ 20:00)  T(F): 98.3, Max: 98.3 (04-06-20 @ 23:25)  HR: 58 (57 - 71)  BP: 166/80 (129/69 - 166/92)  RR: 20 (20 - 20)  SpO2: --    Physical Exam:  General: Awake, Alert. Not in acute distress.  Heart: Regular rate and rhythm; S1, S2; No murmurs.  Lungs: Clear to auscultation bilaterally.  Abdomen: Soft, nontender, nondistended.  Extremities: No edema in upper or lower extremities.  Neuro: No focal deficits.    Labs:  CBC (04-06 @ 05:20)                        Hgb: 11.5   WBC: 10.88 )-----------------( Plts: 357                              Hct: 34.5     Chem (04-06 @ 07:12)  Na: 143  |     Cl: 105     |  BUN: 50  -----------------------------------------< Gluc: 402    K: 4.8   |    HCO3: 28  |  Cr: 0.9    Ca 8.0 (04-06 @ 07:12)  Mg 3.4 (04-06 @ 05:20)        ABG (04-06 @ 17:02)  pH, Arterial: 7.43 pH, Blood: X /  pCO2: 52 /  pO2: 113 / HCO3: 35 / Base Excess: 8.1 /  SaO2: 98       Microbiology:    Radiology:

## 2020-04-07 NOTE — PROGRESS NOTE ADULT - SUBJECTIVE AND OBJECTIVE BOX
FARHAD FINK  55y, Male  Allergy: No Known Allergies      CHIEF COMPLAINT: arf (06 Apr 2020 11:35)      INTERVAL EVENTS/HPI  - No acute events overnight  - T(F): , Max: 98.6 (04-07-20 @ 08:00)  - Denies any worsening symptoms  - Tolerating medication  -     ROS  General: Denies fevers, chills, nightsweats, weight loss  HEENT: Denies headache, rhinorrhea, sore throat, eye pain  CV: Denies CP, palpitations  PULM: Denies SOB, cough  GI: Denies abdominal pain, diarrhea  : Denies dysuria, hematuria  MSK: Denies arthralgias  SKIN: Denies rash   NEURO: Denies paresthesias, weakness  PSYCH: Denies depression    FH non-contributory   Social Hx non-contributory    VITALS:  T(F): 98.6, Max: 98.6 (04-07-20 @ 08:00)  HR: 54  BP: 138/74  RR: 20Vital Signs Last 24 Hrs  T(C): 37 (07 Apr 2020 08:00), Max: 37 (07 Apr 2020 08:00)  T(F): 98.6 (07 Apr 2020 08:00), Max: 98.6 (07 Apr 2020 08:00)  HR: 54 (07 Apr 2020 08:00) (54 - 71)  BP: 138/74 (07 Apr 2020 08:00) (129/69 - 166/92)  BP(mean): 100 (07 Apr 2020 08:00) (93 - 123)  RR: 20 (07 Apr 2020 08:00) (20 - 20)  SpO2: --    PHYSICAL EXAM:  Gen: NAD, resting in bed  HEENT: Normocephalic, atraumatic  Neck: supple, no lymphadenopathy  CV: Regular rate & regular rhythm  Lungs: decreased BS at bases, no fremitus  Abdomen: Soft, BS present  Ext: Warm, well perfused  Neuro: non focal, awake  Skin: no rash, no erythema      TESTS & MEASUREMENTS:                        11.5   10.88 )-----------( 357      ( 06 Apr 2020 05:20 )             34.5     04-06    143  |  105  |  50<H>  ----------------------------<  402<H>  4.8   |  28  |  0.9    Ca    8.0<L>      06 Apr 2020 07:12  Mg     3.4     04-06                    INFECTIOUS DISEASES TESTING      RADIOLOGY & ADDITIONAL TESTS:  I have personally reviewed the last Chest xray  CXR  Xray Chest 1 View- PORTABLE-Urgent:   EXAM:  XR CHEST PORTABLE URGENT 1V            PROCEDURE DATE:  04/05/2020            INTERPRETATION:  Clinical History / Reason for exam: ogt    Comparison : Chest radiograph  4/5/2020    Technique/Positioning: Frontal view of the chest    Findings:    Support devices: There is an endotracheal tube in appropriate position. There is an enteric tube coursing below the diaphragm. There is a right internal jugular central venous catheter.    Cardiac/mediastinum/hilum: Stable.    Lung parenchyma/Pleura: There are bilateral opacities, unchanged on the right and decreased on the left. No evidence of pneumothorax or pleural effusion.    Skeleton/soft tissues: Stable    Impression:      Bilateral opacities, unchanged on the right and decreased on the left.    Lines and support devices as described above.                      TERESA PENA M.D., ATTENDING RADIOLOGIST  This document has been electronically signed. Apr 5 2020 11:18AM             (04-05-20 @ 09:48)  Xray Chest 1 View- PORTABLE-Urgent:   EXAM:  XR CHEST PORTABLE URGENT 1V            PROCEDURE DATE:  04/05/2020            INTERPRETATION:  Clinical History / Reason for exam: ogt    Comparison : Chest radiograph  4/4/2020    Technique/Positioning: Frontal view of the chest    Findings:    Support devices: There is an endotracheal tube in appropriate position. There is an enteric tube coursing below the diaphragm. There is a left internal jugular central venous catheter.    Cardiac/mediastinum/hilum: Stable.    Lung parenchyma/Pleura: Low lung volume. There are bilateral diffuse opacities. No evidence of pneumothorax.    Skeleton/soft tissues: Stable    Impression:      Bilateral diffuse opacities.    Lines and support devices as described above.                      TERESA PENA M.D., ATTENDING RADIOLOGIST  This document has been electronically signed. Apr 5 2020  9:02AM             (04-05-20 @ 05:15)      CT      CARDIOLOGY TESTING  12 Lead ECG:   Ventricular Rate 84 BPM    Atrial Rate 84 BPM    P-R Interval 166 ms    QRS Duration 116 ms    Q-T Interval 362 ms    QTC Calculation(Bezet) 427 ms    P Axis 39 degrees    R Axis -29 degrees    T Axis 12 degrees    Diagnosis Line Normal sinus rhythm  Left axis deviation  Borderline EKG      Confirmed by ISIDORO ROSEN MD ([id number]) on 4/2/2020 10:59:02 PM (04-01-20 @ 11:22)  12 Lead ECG:   Ventricular Rate 98 BPM    Atrial Rate 98 BPM    P-R Interval 152 ms    QRS Duration 108 ms    Q-T Interval 344 ms    QTC Calculation(Bezet) 439 ms    P Axis 35 degrees    R Axis -42 degrees    T Axis 22 degrees    Diagnosis Line Normal sinus rhythm  Left axis deviation  Abnormal ECG    Confirmed by EVERARDO RAGSDALE, [facility] ([id number]) on 3/31/2020 11:39:32 AM (03-31-20 @ 00:46)      MEDICATIONS  chlorhexidine 4% Liquid 1  dextrose 5%. 1000  dextrose 50% Injectable 12.5  dextrose 50% Injectable 25  dextrose 50% Injectable 25  enoxaparin Injectable 40  insulin glargine Injectable (LANTUS) 20  insulin lispro Injectable (HumaLOG) 5  lactated ringers. 1000  lactulose Syrup 15  methylPREDNISolone sodium succinate Injectable 60  pantoprazole  Injectable 40  senna 2      ANTIBIOTICS:      All available historical data has been reviewed

## 2020-04-08 LAB
ALBUMIN SERPL ELPH-MCNC: 3 G/DL — LOW (ref 3.5–5.2)
ALP SERPL-CCNC: 70 U/L — SIGNIFICANT CHANGE UP (ref 30–115)
ALT FLD-CCNC: 100 U/L — HIGH (ref 0–41)
ANION GAP SERPL CALC-SCNC: 9 MMOL/L — SIGNIFICANT CHANGE UP (ref 7–14)
AST SERPL-CCNC: 56 U/L — HIGH (ref 0–41)
BASOPHILS # BLD AUTO: 0.01 K/UL — SIGNIFICANT CHANGE UP (ref 0–0.2)
BASOPHILS NFR BLD AUTO: 0.1 % — SIGNIFICANT CHANGE UP (ref 0–1)
BILIRUB SERPL-MCNC: 0.7 MG/DL — SIGNIFICANT CHANGE UP (ref 0.2–1.2)
BUN SERPL-MCNC: 26 MG/DL — HIGH (ref 10–20)
CALCIUM SERPL-MCNC: 8.3 MG/DL — LOW (ref 8.5–10.1)
CHLORIDE SERPL-SCNC: 98 MMOL/L — SIGNIFICANT CHANGE UP (ref 98–110)
CO2 SERPL-SCNC: 33 MMOL/L — HIGH (ref 17–32)
CREAT SERPL-MCNC: 0.6 MG/DL — LOW (ref 0.7–1.5)
CRP SERPL-MCNC: 1.67 MG/DL — HIGH (ref 0–0.4)
EOSINOPHIL # BLD AUTO: 0.02 K/UL — SIGNIFICANT CHANGE UP (ref 0–0.7)
EOSINOPHIL NFR BLD AUTO: 0.2 % — SIGNIFICANT CHANGE UP (ref 0–8)
GLUCOSE BLDC GLUCOMTR-MCNC: 208 MG/DL — HIGH (ref 70–99)
GLUCOSE BLDC GLUCOMTR-MCNC: 211 MG/DL — HIGH (ref 70–99)
GLUCOSE BLDC GLUCOMTR-MCNC: 263 MG/DL — HIGH (ref 70–99)
GLUCOSE BLDC GLUCOMTR-MCNC: 279 MG/DL — HIGH (ref 70–99)
GLUCOSE SERPL-MCNC: 223 MG/DL — HIGH (ref 70–99)
HCT VFR BLD CALC: 37 % — LOW (ref 42–52)
HGB BLD-MCNC: 12.2 G/DL — LOW (ref 14–18)
IMM GRANULOCYTES NFR BLD AUTO: 0.8 % — HIGH (ref 0.1–0.3)
LYMPHOCYTES # BLD AUTO: 0.94 K/UL — LOW (ref 1.2–3.4)
LYMPHOCYTES # BLD AUTO: 8.7 % — LOW (ref 20.5–51.1)
MCHC RBC-ENTMCNC: 29.3 PG — SIGNIFICANT CHANGE UP (ref 27–31)
MCHC RBC-ENTMCNC: 33 G/DL — SIGNIFICANT CHANGE UP (ref 32–37)
MCV RBC AUTO: 88.7 FL — SIGNIFICANT CHANGE UP (ref 80–94)
MONOCYTES # BLD AUTO: 0.45 K/UL — SIGNIFICANT CHANGE UP (ref 0.1–0.6)
MONOCYTES NFR BLD AUTO: 4.2 % — SIGNIFICANT CHANGE UP (ref 1.7–9.3)
NEUTROPHILS # BLD AUTO: 9.3 K/UL — HIGH (ref 1.4–6.5)
NEUTROPHILS NFR BLD AUTO: 86 % — HIGH (ref 42.2–75.2)
NRBC # BLD: 0 /100 WBCS — SIGNIFICANT CHANGE UP (ref 0–0)
PLATELET # BLD AUTO: 369 K/UL — SIGNIFICANT CHANGE UP (ref 130–400)
POTASSIUM SERPL-MCNC: 5 MMOL/L — SIGNIFICANT CHANGE UP (ref 3.5–5)
POTASSIUM SERPL-SCNC: 5 MMOL/L — SIGNIFICANT CHANGE UP (ref 3.5–5)
PROCALCITONIN SERPL-MCNC: 0.06 NG/ML — SIGNIFICANT CHANGE UP (ref 0.02–0.1)
PROT SERPL-MCNC: 6.2 G/DL — SIGNIFICANT CHANGE UP (ref 6–8)
RBC # BLD: 4.17 M/UL — LOW (ref 4.7–6.1)
RBC # FLD: 11.5 % — SIGNIFICANT CHANGE UP (ref 11.5–14.5)
SODIUM SERPL-SCNC: 140 MMOL/L — SIGNIFICANT CHANGE UP (ref 135–146)
WBC # BLD: 10.81 K/UL — HIGH (ref 4.8–10.8)
WBC # FLD AUTO: 10.81 K/UL — HIGH (ref 4.8–10.8)

## 2020-04-08 PROCEDURE — 71045 X-RAY EXAM CHEST 1 VIEW: CPT | Mod: 26

## 2020-04-08 PROCEDURE — 99232 SBSQ HOSP IP/OBS MODERATE 35: CPT

## 2020-04-08 RX ORDER — INSULIN GLARGINE 100 [IU]/ML
10 INJECTION, SOLUTION SUBCUTANEOUS ONCE
Refills: 0 | Status: COMPLETED | OUTPATIENT
Start: 2020-04-08 | End: 2020-04-08

## 2020-04-08 RX ADMIN — PANTOPRAZOLE SODIUM 40 MILLIGRAM(S): 20 TABLET, DELAYED RELEASE ORAL at 12:40

## 2020-04-08 RX ADMIN — CHLORHEXIDINE GLUCONATE 1 APPLICATION(S): 213 SOLUTION TOPICAL at 05:34

## 2020-04-08 RX ADMIN — Medication 60 MILLIGRAM(S): at 17:16

## 2020-04-08 RX ADMIN — INSULIN GLARGINE 10 UNIT(S): 100 INJECTION, SOLUTION SUBCUTANEOUS at 23:55

## 2020-04-08 RX ADMIN — ENOXAPARIN SODIUM 40 MILLIGRAM(S): 100 INJECTION SUBCUTANEOUS at 22:40

## 2020-04-08 RX ADMIN — Medication 60 MILLIGRAM(S): at 05:31

## 2020-04-08 NOTE — PROGRESS NOTE ADULT - ASSESSMENT
56 y/o M from home with PMH of Psoriasis and no PSH presents with fever x 15 days to [facility], COVID 19 +, transferred to [facility] for bed availability, found to be hypoxic, requiring mechanical ventilation. S/p extubation 4/6/2020    Assessment:    Acute hypoxemic respiratory failure secondary to SARS COV 2 PNA  ARDS    PLAN:    CNS: avoid sedatives    HEENT:  Oral care    PULMONARY:  HOB @ 45 degrees, s/p extubation on 4/6/2020, currently on NRB > titrate down on O2.   CXR this AM showing stable b/l opacities    CARDIOVASCULAR: , MAP >60, s/p EPS monitor removal on 4/5/2020    GI: GI prophylaxis Protonix, NPO until cleared by Sp and Sw s/p extubation (can not while on NRB), Bowel regimen    RENAL:  F/u  lytes.  Correct as needed. accurate I/O, f/u 8 pm BMP    INFECTIOUS DISEASE: s/p ceftriaxone (finished 4/6/2020), s/p HCQ (finished 4/5/2020), f/u BCx,   procalcitonin/CRP q48hrs    HEMATOLOGICAL:  DVT prophylaxis.    ENDOCRINE:  Elevated FS > Insulin protocol. c/w Solumedrol day 5    MUSCULOSKELETAL: Bed rest for now    CODE STATUS: FULL CODE  MICU stay 54 y/o M from home with PMH of Psoriasis and no PSH presents with fever x 15 days to [facility], COVID 19 +, transferred to [facility] for bed availability, found to be hypoxic, requiring mechanical ventilation. S/p extubation 4/6/2020    Assessment:    Acute hypoxemic respiratory failure secondary to SARS COV 2 PNA  ARDS    PLAN:    CNS: avoid sedatives    HEENT:  Oral care    PULMONARY:  HOB @ 45 degrees, s/p extubation on 4/6/2020, currently on NRB > titrate down on O2.   CXR this AM showing stable b/l opacities    CARDIOVASCULAR: , MAP >60, s/p EPS monitor removal on 4/5/2020    GI: GI prophylaxis Protonix, NPO until cleared by Sp and Sw s/p extubation (can not while on NRB), Bowel regimen    RENAL:  F/u  lytes.  Correct as needed. accurate I/O, f/u 8 pm BMP    INFECTIOUS DISEASE: s/p ceftriaxone (finished 4/6/2020), s/p HCQ (finished 4/5/2020)  f/u BCx,   CRP  procalcitonin/CRP q48hrs    HEMATOLOGICAL:  DVT prophylaxis.    ENDOCRINE:  Elevated FS > Insulin protocol. finished Solumedrol 4/7/2020    MUSCULOSKELETAL: Bed rest for now    CODE STATUS: FULL CODE  Possible downgrade in PM 54 y/o M from home with PMH of Psoriasis and no PSH presents with fever x 15 days to [facility], COVID 19 +, transferred to [facility] for bed availability, found to be hypoxic, requiring mechanical ventilation. S/p extubation 4/6/2020    Assessment:    Acute hypoxemic respiratory failure secondary to SARS COV 2 PNA  ARDS    PLAN:    CNS: avoid sedatives    HEENT:  Oral care    PULMONARY:  HOB @ 45 degrees, s/p extubation on 4/6/2020, currently on NRB > titrate down on O2.   CXR this AM showing improved b/l opacities    CARDIOVASCULAR: , MAP >60, s/p EPS monitor removal on 4/5/2020    GI: GI prophylaxis Protonix, NPO until cleared by Sp and Sw s/p extubation (can not while on NRB)  Bowel regimen    RENAL:  F/u  lytes.  Correct as needed. accurate I/O, f/u 8 pm BMP    INFECTIOUS DISEASE: finished Abx  s/p ceftriaxone (finished 4/6/2020), s/p HCQ (finished 4/5/2020)  f/u BCx,   CRP 1.67, Procal 0.06  procalcitonin/CRP q48hrs    HEMATOLOGICAL:  DVT prophylaxis.    ENDOCRINE:  Elevated FS > Insulin protocol, finished Solumedrol 4/7/2020    MUSCULOSKELETAL: Bed rest for now    CODE STATUS: FULL CODE  Possible downgrade in PM 56 y/o M from home with PMH of Psoriasis and no PSH presents with fever x 15 days to [facility], COVID 19 +, transferred to [facility] for bed availability, found to be hypoxic, requiring mechanical ventilation. S/p extubation 4/6/2020    Assessment:    Acute hypoxemic respiratory failure secondary to SARS COV 2 PNA  ARDS - resolved  Severe sepsis with SIRS and lactic acidosis - resolved    PLAN:    CNS: avoid sedatives    HEENT:  Oral care    PULMONARY:  HOB @ 45 degrees, s/p extubation on 4/6/2020, currently on NRB > titrate down on O2.   CXR this AM showing improved b/l opacities    CARDIOVASCULAR: , MAP >60, s/p EPS monitor removal on 4/5/2020    GI: GI prophylaxis Protonix, NPO until cleared by Sp and Sw s/p extubation (can not while on NRB)  Bowel regimen    RENAL:  F/u  lytes.  Correct as needed. accurate I/O, f/u 8 pm BMP    INFECTIOUS DISEASE: finished Abx  s/p ceftriaxone (finished 4/6/2020), s/p HCQ (finished 4/5/2020)  f/u BCx,   CRP 1.67, Procal 0.06  procalcitonin/CRP q48hrs    HEMATOLOGICAL:  DVT prophylaxis.    ENDOCRINE:  Elevated FS > Insulin protocol, finished Solumedrol 4/7/2020    MUSCULOSKELETAL: Bed rest for now    CODE STATUS: FULL CODE  Possible downgrade in PM

## 2020-04-08 NOTE — PROGRESS NOTE ADULT - SUBJECTIVE AND OBJECTIVE BOX
Patient Information:  FARHAD FINK / 55y / Male / MRN#:[id number]    Hospital Day: 8d    HPI:    Interval History:  Patient seen and examined at bedside. No acute events overnight.    Past Medical History:  Psoriasis  No pertinent past medical history    Past Surgical History:  No significant past surgical history    Allergies:  No Known Allergies    Medications:  PRN:  acetaminophen   Tablet .. 650 milliGRAM(s) Oral every 6 hours PRN Temp greater or equal to 38C (100.4F), Mild Pain (1 - 3)  bisacodyl Suppository 10 milliGRAM(s) Rectal daily PRN Constipation  dextrose 40% Gel 15 Gram(s) Oral once PRN Blood Glucose LESS THAN 70 milliGRAM(s)/deciliter  glucagon  Injectable 1 milliGRAM(s) IntraMuscular once PRN Glucose LESS THAN 70 milligrams/deciliter    Standing:  chlorhexidine 4% Liquid 1 Application(s) Topical <User Schedule>  dextrose 5%. 1000 milliLiter(s) (75 mL/Hr) IV Continuous <Continuous>  dextrose 5%. 1000 milliLiter(s) (50 mL/Hr) IV Continuous <Continuous>  dextrose 50% Injectable 12.5 Gram(s) IV Push once  dextrose 50% Injectable 25 Gram(s) IV Push once  dextrose 50% Injectable 25 Gram(s) IV Push once  enoxaparin Injectable 40 milliGRAM(s) SubCutaneous at bedtime  insulin glargine Injectable (LANTUS) 20 Unit(s) SubCutaneous at bedtime  insulin lispro Injectable (HumaLOG) 5 Unit(s) SubCutaneous every 6 hours  lactated ringers. 1000 milliLiter(s) (60 mL/Hr) IV Continuous <Continuous>  lactulose Syrup 15 Gram(s) Oral daily  methylPREDNISolone sodium succinate Injectable 60 milliGRAM(s) IV Push two times a day  pantoprazole  Injectable 40 milliGRAM(s) IV Push daily  senna 2 Tablet(s) Oral at bedtime    Home:    Vitals:  T(C): 36.6, Max: 36.6 (04-07-20 @ 23:00)  T(F): 97.9, Max: 97.9 (04-07-20 @ 23:00)  HR: 50 (50 - 61)  BP: 125/74 (125/74 - 134/74)  RR: 20 (18 - 20)  SpO2: 97% (97% - 97%)    Physical Exam:  General: Awake, Alert. Not in acute distress.  Heart: Regular rate and rhythm; S1, S2; No murmurs.  Lungs: Clear to auscultation bilaterally.  Abdomen: Soft, nontender, nondistended.  Extremities: No edema in upper or lower extremities.  Neuro: AAOx3, No focal deficits.    Labs:  CBC (04-08 @ 06:11)                        Hgb: 12.2   WBC: 10.81 )-----------------( Plts: 369                              Hct: 37.0     Chem (04-08 @ 06:11)  Na: 140  |     Cl: 98     |  BUN: 26  -----------------------------------------< Gluc: 223    K: 5.0   |    HCO3: 33  |  Cr: 0.6    Ca 8.3 (04-08 @ 06:11)  Mg 2.2 (04-07 @ 08:24)    LFTs (04-08 @ 06:11)  TPro 6.2  /  Alb 3.0  TBili 0.7  /  DBili     AST 56  /    /  AlkPhos 70      ABG (04-06 @ 17:02)  pH, Arterial: 7.43 pH, Blood: X /  pCO2: 52 /  pO2: 113 / HCO3: 35 / Base Excess: 8.1 /  SaO2: 98       Microbiology:  Culture - Blood (collected 04-06 @ 07:12)  Source: .Blood None  Preliminary Report (04-07 @ 13:01):    No growth to date.        Radiology: Patient Information:  FARHAD FINK / 55y / Male / MRN#:[id number]    Hospital Day: 8d    HPI:    Interval History:  Patient seen and examined at bedside. No acute events overnight.    Past Medical History:  Psoriasis  No pertinent past medical history    Past Surgical History:  No significant past surgical history    Allergies:  No Known Allergies    Medications:  PRN:  acetaminophen   Tablet .. 650 milliGRAM(s) Oral every 6 hours PRN Temp greater or equal to 38C (100.4F), Mild Pain (1 - 3)  bisacodyl Suppository 10 milliGRAM(s) Rectal daily PRN Constipation  dextrose 40% Gel 15 Gram(s) Oral once PRN Blood Glucose LESS THAN 70 milliGRAM(s)/deciliter  glucagon  Injectable 1 milliGRAM(s) IntraMuscular once PRN Glucose LESS THAN 70 milligrams/deciliter    Standing:  chlorhexidine 4% Liquid 1 Application(s) Topical <User Schedule>  dextrose 5%. 1000 milliLiter(s) (75 mL/Hr) IV Continuous <Continuous>  dextrose 5%. 1000 milliLiter(s) (50 mL/Hr) IV Continuous <Continuous>  dextrose 50% Injectable 12.5 Gram(s) IV Push once  dextrose 50% Injectable 25 Gram(s) IV Push once  dextrose 50% Injectable 25 Gram(s) IV Push once  enoxaparin Injectable 40 milliGRAM(s) SubCutaneous at bedtime  insulin glargine Injectable (LANTUS) 20 Unit(s) SubCutaneous at bedtime  insulin lispro Injectable (HumaLOG) 5 Unit(s) SubCutaneous every 6 hours  lactated ringers. 1000 milliLiter(s) (60 mL/Hr) IV Continuous <Continuous>  lactulose Syrup 15 Gram(s) Oral daily  methylPREDNISolone sodium succinate Injectable 60 milliGRAM(s) IV Push two times a day  pantoprazole  Injectable 40 milliGRAM(s) IV Push daily  senna 2 Tablet(s) Oral at bedtime    Home:    Vitals:  T(C): 36.6, Max: 36.6 (04-07-20 @ 23:00)  T(F): 97.9, Max: 97.9 (04-07-20 @ 23:00)  HR: 50 (50 - 61)  BP: 125/74 (125/74 - 134/74)  RR: 20 (18 - 20)  SpO2: 97% (97% - 97%)    Physical Exam:  General: Awake, Alert. Not in acute distress.  Heart: Regular rate and rhythm; S1, S2; No murmurs.  Lungs: Clear to auscultation bilaterally.  Abdomen: Soft, nontender, nondistended.  Extremities: No edema in upper or lower extremities.  Neuro: No focal deficits.    Labs:  CBC (04-08 @ 06:11)                        Hgb: 12.2   WBC: 10.81 )-----------------( Plts: 369                              Hct: 37.0     Chem (04-08 @ 06:11)  Na: 140  |     Cl: 98     |  BUN: 26  -----------------------------------------< Gluc: 223    K: 5.0   |    HCO3: 33  |  Cr: 0.6    Ca 8.3 (04-08 @ 06:11)  Mg 2.2 (04-07 @ 08:24)    LFTs (04-08 @ 06:11)  TPro 6.2  /  Alb 3.0  TBili 0.7  /  DBili     AST 56  /    /  AlkPhos 70      ABG (04-06 @ 17:02)  pH, Arterial: 7.43 pH, Blood: X /  pCO2: 52 /  pO2: 113 / HCO3: 35 / Base Excess: 8.1 /  SaO2: 98       Microbiology:  Culture - Blood (collected 04-06 @ 07:12)  Source: .Blood None  Preliminary Report (04-07 @ 13:01):    No growth to date.        Radiology:

## 2020-04-08 NOTE — PROGRESS NOTE ADULT - SUBJECTIVE AND OBJECTIVE BOX
Patient is a 55y old  Male who presents with a chief complaint of COVID-19 Pneumonia (07 Apr 2020 08:47)    Over Night Events:    No events. Off all drips       ROS:  See HPI    PHYSICAL EXAM    ICU Vital Signs Last 24 Hrs  T(C): 36.6 (08 Apr 2020 07:47), Max: 36.6 (07 Apr 2020 23:00)  T(F): 97.9 (08 Apr 2020 07:47), Max: 97.9 (07 Apr 2020 23:00)  HR: 50 (08 Apr 2020 07:47) (50 - 61)  BP: 125/74 (08 Apr 2020 07:47) (125/74 - 134/74)  BP(mean): 94 (08 Apr 2020 07:47) (94 - 96)  ABP: --  ABP(mean): --  RR: 20 (08 Apr 2020 07:47) (18 - 20)  SpO2: 97% (07 Apr 2020 16:38) (97% - 97%)      General: AAO x 3   HEENT: FRANK             Lymphatic system: No cervical LN   Lungs: Bilateral BS, bilateral rales   Cardiovascular: Regular   Gastrointestinal: Soft, Positive BS  Extremities: No clubbing.  Moves extremities.  Full Range of motion   Skin: Warm, intact  Neurological: No motor or sensory deficit       04-07-20 @ 07:01  -  04-08-20 @ 07:00  --------------------------------------------------------  IN:    dextrose 5%.: 1125 mL    lactated ringers.: 480 mL  Total IN: 1605 mL    OUT:    Indwelling Catheter - Urethral: 1800 mL  Total OUT: 1800 mL    Total NET: -195 mL      04-08-20 @ 07:01  -  04-08-20 @ 13:07  --------------------------------------------------------  IN:    dextrose 5%.: 225 mL  Total IN: 225 mL    OUT:  Total OUT: 0 mL    Total NET: 225 mL      LABS:                            12.2   10.81 )-----------( 369      ( 08 Apr 2020 06:11 )             37.0                                               04-08    140  |  98  |  26<H>  ----------------------------<  223<H>  5.0   |  33<H>  |  0.6<L>    Ca    8.3<L>      08 Apr 2020 06:11  Mg     2.2     04-07    TPro  6.2  /  Alb  3.0<L>  /  TBili  0.7  /  DBili  x   /  AST  56<H>  /  ALT  100<H>  /  AlkPhos  70  04-08                                              LIVER FUNCTIONS - ( 08 Apr 2020 06:11 )  Alb: 3.0 g/dL / Pro: 6.2 g/dL / ALK PHOS: 70 U/L / ALT: 100 U/L / AST: 56 U/L / GGT: x                                                  Culture - Blood (collected 06 Apr 2020 07:12)  Source: .Blood None  Preliminary Report (07 Apr 2020 13:01):    No growth to date.                                                                                       ABG - ( 06 Apr 2020 17:02 )  pH, Arterial: 7.43  pH, Blood: x     /  pCO2: 52    /  pO2: 113   / HCO3: 35    / Base Excess: 8.1   /  SaO2: 98        MEDICATIONS  (STANDING):  chlorhexidine 4% Liquid 1 Application(s) Topical <User Schedule>  dextrose 5%. 1000 milliLiter(s) (75 mL/Hr) IV Continuous <Continuous>  dextrose 5%. 1000 milliLiter(s) (50 mL/Hr) IV Continuous <Continuous>  dextrose 50% Injectable 12.5 Gram(s) IV Push once  dextrose 50% Injectable 25 Gram(s) IV Push once  dextrose 50% Injectable 25 Gram(s) IV Push once  enoxaparin Injectable 40 milliGRAM(s) SubCutaneous at bedtime  insulin glargine Injectable (LANTUS) 20 Unit(s) SubCutaneous at bedtime  insulin lispro Injectable (HumaLOG) 5 Unit(s) SubCutaneous every 6 hours  lactated ringers. 1000 milliLiter(s) (60 mL/Hr) IV Continuous <Continuous>  lactulose Syrup 15 Gram(s) Oral daily  methylPREDNISolone sodium succinate Injectable 60 milliGRAM(s) IV Push two times a day  pantoprazole  Injectable 40 milliGRAM(s) IV Push daily  senna 2 Tablet(s) Oral at bedtime    MEDICATIONS  (PRN):  acetaminophen   Tablet .. 650 milliGRAM(s) Oral every 6 hours PRN Temp greater or equal to 38C (100.4F), Mild Pain (1 - 3)  bisacodyl Suppository 10 milliGRAM(s) Rectal daily PRN Constipation  dextrose 40% Gel 15 Gram(s) Oral once PRN Blood Glucose LESS THAN 70 milliGRAM(s)/deciliter  glucagon  Injectable 1 milliGRAM(s) IntraMuscular once PRN Glucose LESS THAN 70 milligrams/deciliter      Xrays:            improved cxr                                                                         ECHO

## 2020-04-08 NOTE — PROGRESS NOTE ADULT - ASSESSMENT
Assessment:    Acute hypoxemic respiratory failure   SARS-COV-2 infection   ARDS    PLAN:    CNS: Avoid sedatives     HEENT:  Oral care    PULMONARY:  HOB @ 45 degrees, Taper FIo2     CARDIOVASCULAR:  i=O    GI: GI prophylaxis Protonix                                         Feeding:  speech and swallow when off NRB     RENAL:  F/u  lytes.  Correct as needed. accurate I/O,     INFECTIOUS DISEASE: s/p abx    HEMATOLOGICAL:  DVT prophylaxis.    ENDOCRINE:  Follow up FS.  Insulin protocol if needed.  Solumedrol DC    MUSCULOSKELETAL: Bed rest for now    CODE STATUS: FULL CODE    DISPOSITION: Possible downgrade in pm

## 2020-04-09 LAB
ALBUMIN SERPL ELPH-MCNC: 3 G/DL — LOW (ref 3.5–5.2)
ALP SERPL-CCNC: 75 U/L — SIGNIFICANT CHANGE UP (ref 30–115)
ALT FLD-CCNC: 86 U/L — HIGH (ref 0–41)
ANION GAP SERPL CALC-SCNC: 8 MMOL/L — SIGNIFICANT CHANGE UP (ref 7–14)
AST SERPL-CCNC: 33 U/L — SIGNIFICANT CHANGE UP (ref 0–41)
BASOPHILS # BLD AUTO: 0.01 K/UL — SIGNIFICANT CHANGE UP (ref 0–0.2)
BASOPHILS NFR BLD AUTO: 0.1 % — SIGNIFICANT CHANGE UP (ref 0–1)
BILIRUB SERPL-MCNC: 0.7 MG/DL — SIGNIFICANT CHANGE UP (ref 0.2–1.2)
BUN SERPL-MCNC: 23 MG/DL — HIGH (ref 10–20)
CALCIUM SERPL-MCNC: 8.4 MG/DL — LOW (ref 8.5–10.1)
CHLORIDE SERPL-SCNC: 95 MMOL/L — LOW (ref 98–110)
CO2 SERPL-SCNC: 30 MMOL/L — SIGNIFICANT CHANGE UP (ref 17–32)
CREAT SERPL-MCNC: 0.6 MG/DL — LOW (ref 0.7–1.5)
EOSINOPHIL # BLD AUTO: 0.03 K/UL — SIGNIFICANT CHANGE UP (ref 0–0.7)
EOSINOPHIL NFR BLD AUTO: 0.3 % — SIGNIFICANT CHANGE UP (ref 0–8)
GLUCOSE BLDC GLUCOMTR-MCNC: 163 MG/DL — HIGH (ref 70–99)
GLUCOSE BLDC GLUCOMTR-MCNC: 170 MG/DL — HIGH (ref 70–99)
GLUCOSE BLDC GLUCOMTR-MCNC: 175 MG/DL — HIGH (ref 70–99)
GLUCOSE SERPL-MCNC: 225 MG/DL — HIGH (ref 70–99)
HCT VFR BLD CALC: 37.4 % — LOW (ref 42–52)
HGB BLD-MCNC: 13.6 G/DL — LOW (ref 14–18)
IMM GRANULOCYTES NFR BLD AUTO: 0.6 % — HIGH (ref 0.1–0.3)
LYMPHOCYTES # BLD AUTO: 1.11 K/UL — LOW (ref 1.2–3.4)
LYMPHOCYTES # BLD AUTO: 10.2 % — LOW (ref 20.5–51.1)
MCHC RBC-ENTMCNC: 31.1 PG — HIGH (ref 27–31)
MCHC RBC-ENTMCNC: 36.4 G/DL — SIGNIFICANT CHANGE UP (ref 32–37)
MCV RBC AUTO: 85.6 FL — SIGNIFICANT CHANGE UP (ref 80–94)
MONOCYTES # BLD AUTO: 0.51 K/UL — SIGNIFICANT CHANGE UP (ref 0.1–0.6)
MONOCYTES NFR BLD AUTO: 4.7 % — SIGNIFICANT CHANGE UP (ref 1.7–9.3)
NEUTROPHILS # BLD AUTO: 9.17 K/UL — HIGH (ref 1.4–6.5)
NEUTROPHILS NFR BLD AUTO: 84.1 % — HIGH (ref 42.2–75.2)
NRBC # BLD: 0 /100 WBCS — SIGNIFICANT CHANGE UP (ref 0–0)
PLATELET # BLD AUTO: 365 K/UL — SIGNIFICANT CHANGE UP (ref 130–400)
POTASSIUM SERPL-MCNC: 4.7 MMOL/L — SIGNIFICANT CHANGE UP (ref 3.5–5)
POTASSIUM SERPL-SCNC: 4.7 MMOL/L — SIGNIFICANT CHANGE UP (ref 3.5–5)
PROT SERPL-MCNC: 6.4 G/DL — SIGNIFICANT CHANGE UP (ref 6–8)
RBC # BLD: 4.37 M/UL — LOW (ref 4.7–6.1)
RBC # FLD: 11.2 % — LOW (ref 11.5–14.5)
SODIUM SERPL-SCNC: 133 MMOL/L — LOW (ref 135–146)
WBC # BLD: 10.89 K/UL — HIGH (ref 4.8–10.8)
WBC # FLD AUTO: 10.89 K/UL — HIGH (ref 4.8–10.8)

## 2020-04-09 PROCEDURE — 99232 SBSQ HOSP IP/OBS MODERATE 35: CPT

## 2020-04-09 PROCEDURE — 71045 X-RAY EXAM CHEST 1 VIEW: CPT | Mod: 26

## 2020-04-09 RX ADMIN — INSULIN GLARGINE 20 UNIT(S): 100 INJECTION, SOLUTION SUBCUTANEOUS at 22:40

## 2020-04-09 RX ADMIN — ENOXAPARIN SODIUM 40 MILLIGRAM(S): 100 INJECTION SUBCUTANEOUS at 22:40

## 2020-04-09 RX ADMIN — CHLORHEXIDINE GLUCONATE 1 APPLICATION(S): 213 SOLUTION TOPICAL at 04:48

## 2020-04-09 RX ADMIN — LACTULOSE 15 GRAM(S): 10 SOLUTION ORAL at 12:02

## 2020-04-09 RX ADMIN — Medication 5 UNIT(S): at 18:24

## 2020-04-09 RX ADMIN — PANTOPRAZOLE SODIUM 40 MILLIGRAM(S): 20 TABLET, DELAYED RELEASE ORAL at 12:02

## 2020-04-09 NOTE — PROGRESS NOTE ADULT - SUBJECTIVE AND OBJECTIVE BOX
Patient is a 55y old  Male who presents with a chief complaint of COVID-19 Pneumonia (08 Apr 2020 23:26)    Over Night Events: Doing well. On Nasal canula     ROS:  See HPI    PHYSICAL EXAM    ICU Vital Signs Last 24 Hrs  T(C): 36.7 (09 Apr 2020 09:27), Max: 37.3 (08 Apr 2020 19:00)  T(F): 98.1 (09 Apr 2020 09:27), Max: 99.1 (08 Apr 2020 19:00)  HR: 61 (09 Apr 2020 09:27) (54 - 99)  BP: 104/66 (09 Apr 2020 09:27) (104/66 - 155/70)  BP(mean): 79 (09 Apr 2020 09:27) (79 - 89)  RR: 23 (09 Apr 2020 09:27) (20 - 27)  SpO2: 95% (09 Apr 2020 11:53) (94% - 98%)      General: AAO x 3   HEENT: FRANK             Lymphatic system: No cervical LN   Lungs: Bilateral BS  Cardiovascular: Regular   Gastrointestinal: Soft, Positive BS  Extremities: No clubbing.  Moves extremities.  Full Range of motion   Skin: Warm, intact  Neurological: No motor or sensory deficit       04-08-20 @ 07:01  -  04-09-20 @ 07:00  --------------------------------------------------------  IN:    dextrose 5%.: 1350 mL  Total IN: 1350 mL    OUT:    Indwelling Catheter - Urethral: 1850 mL  Total OUT: 1850 mL    Total NET: -500 mL          LABS:                            13.6   10.89 )-----------( 365      ( 09 Apr 2020 05:30 )             37.4                                               04-09    133<L>  |  95<L>  |  23<H>  ----------------------------<  225<H>  4.7   |  30  |  0.6<L>    Ca    8.4<L>      09 Apr 2020 05:30    TPro  6.4  /  Alb  3.0<L>  /  TBili  0.7  /  DBili  x   /  AST  33  /  ALT  86<H>  /  AlkPhos  75  04-09                                            LIVER FUNCTIONS - ( 09 Apr 2020 05:30 )  Alb: 3.0 g/dL / Pro: 6.4 g/dL / ALK PHOS: 75 U/L / ALT: 86 U/L / AST: 33 U/L / GGT: x                                              MEDICATIONS  (STANDING):  chlorhexidine 4% Liquid 1 Application(s) Topical <User Schedule>  dextrose 5%. 1000 milliLiter(s) (50 mL/Hr) IV Continuous <Continuous>  dextrose 50% Injectable 12.5 Gram(s) IV Push once  dextrose 50% Injectable 25 Gram(s) IV Push once  dextrose 50% Injectable 25 Gram(s) IV Push once  enoxaparin Injectable 40 milliGRAM(s) SubCutaneous at bedtime  insulin glargine Injectable (LANTUS) 20 Unit(s) SubCutaneous at bedtime  insulin lispro Injectable (HumaLOG) 5 Unit(s) SubCutaneous every 6 hours  lactated ringers. 1000 milliLiter(s) (60 mL/Hr) IV Continuous <Continuous>  lactulose Syrup 15 Gram(s) Oral daily  pantoprazole  Injectable 40 milliGRAM(s) IV Push daily  senna 2 Tablet(s) Oral at bedtime    MEDICATIONS  (PRN):  acetaminophen   Tablet .. 650 milliGRAM(s) Oral every 6 hours PRN Temp greater or equal to 38C (100.4F), Mild Pain (1 - 3)  bisacodyl Suppository 10 milliGRAM(s) Rectal daily PRN Constipation  dextrose 40% Gel 15 Gram(s) Oral once PRN Blood Glucose LESS THAN 70 milliGRAM(s)/deciliter  glucagon  Injectable 1 milliGRAM(s) IntraMuscular once PRN Glucose LESS THAN 70 milligrams/deciliter      Xrays:        bilateral opacities                                                                             ECHO

## 2020-04-09 NOTE — PROGRESS NOTE ADULT - ASSESSMENT
56 y/o M from home with PMH of Psoriasis and no PSH presents with fever x 15 days to [facility], COVID 19 +, transferred to [facility] for bed availability, found to be hypoxic, requiring mechanical ventilation. S/p extubation 4/6/2020    Assessment:    Acute hypoxemic respiratory failure secondary to SARS COV 2 PNA  ARDS - resolved  Severe sepsis with SIRS and lactic acidosis - resolved    PLAN:    CNS: avoid sedatives    HEENT:  Oral care    PULMONARY:  HOB @ 45 degrees, s/p extubation on 4/6/2020, currently on NRB > titrate down on O2.   CXR this AM showing improved b/l opacities    CARDIOVASCULAR: , MAP >60, s/p EPS monitor removal on 4/5/2020    GI: GI prophylaxis Protonix, NPO until cleared by Sp and Sw s/p extubation (can not while on NRB)  Bowel regimen    RENAL:  F/u  lytes.  Correct as needed. accurate I/O, f/u 8 pm BMP    INFECTIOUS DISEASE: finished Abx  s/p ceftriaxone (finished 4/6/2020), s/p HCQ (finished 4/5/2020)  f/u BCx,   CRP 1.67, Procal 0.06  procalcitonin/CRP q48hrs    HEMATOLOGICAL:  DVT prophylaxis.    ENDOCRINE:  Elevated FS > Insulin protocol, finished Solumedrol 4/7/2020    MUSCULOSKELETAL: Bed rest for now    CODE STATUS: FULL CODE  Possible downgrade in PM 54 y/o M from home with PMH of Psoriasis and no PSH presents with fever x 15 days to [facility], COVID 19 +, transferred to [facility] for bed availability, found to be hypoxic, requiring mechanical ventilation. S/p extubation 4/6/2020    Assessment:    Acute hypoxemic respiratory failure secondary to SARS COV 2 PNA  ARDS - resolved  Severe sepsis with SIRS and lactic acidosis - resolved    PLAN:    CNS: avoid sedatives    HEENT:  Oral care    PULMONARY:  HOB @ 45 degrees, s/p extubation on 4/6/2020, transitioned from NRB to Venti and now on NC at 3L saturating 95%.     CARDIOVASCULAR: , MAP >60, s/p EPS monitor removal on 4/5/2020    GI: Passed bedside speech and swallow, resume feed  GI prophylaxis Protonix  Bowel regimen    RENAL:  F/u  lytes.  Correct as needed. accurate I/O    INFECTIOUS DISEASE: finished Abx  s/p ceftriaxone (finished 4/6/2020), s/p HCQ (finished 4/5/2020)  BCx NGTD   Procalcitonin 0.06<--0.46<--0.38, CRP 1.67<--24.6<--30.51  procalcitonin/CRP q48hrs    HEMATOLOGICAL:  DVT prophylaxis.    ENDOCRINE:  Elevated FS > Insulin protocol, finished Solumedrol 4/7/2020    MUSCULOSKELETAL: Bed rest for now    CODE STATUS: FULL CODE  Downgrade

## 2020-04-09 NOTE — PROVIDER CONTACT NOTE (OTHER) - ACTION/TREATMENT ORDERED:
No RN interventions at this time. waiting on decision from med team regarding changing NPO status and voiding trial..  Will continue to monitor closely

## 2020-04-09 NOTE — PROVIDER CONTACT NOTE (OTHER) - ASSESSMENT
Pt able to swallow thin liquids, pudding/applesauce, crackers with no difficulties. Pt breathing comfortably

## 2020-04-09 NOTE — PROGRESS NOTE ADULT - SUBJECTIVE AND OBJECTIVE BOX
Patient Information:  FARHAD FINK / 55y / Male / MRN#:[id number]    Hospital Day: 9d    HPI:    Interval History:  Patient seen and examined at bedside. No acute events overnight.    Past Medical History:  Psoriasis  No pertinent past medical history    Past Surgical History:  No significant past surgical history    Allergies:  No Known Allergies    Medications:  PRN:  acetaminophen   Tablet .. 650 milliGRAM(s) Oral every 6 hours PRN Temp greater or equal to 38C (100.4F), Mild Pain (1 - 3)  bisacodyl Suppository 10 milliGRAM(s) Rectal daily PRN Constipation  dextrose 40% Gel 15 Gram(s) Oral once PRN Blood Glucose LESS THAN 70 milliGRAM(s)/deciliter  glucagon  Injectable 1 milliGRAM(s) IntraMuscular once PRN Glucose LESS THAN 70 milligrams/deciliter    Standing:  chlorhexidine 4% Liquid 1 Application(s) Topical <User Schedule>  dextrose 5%. 1000 milliLiter(s) (50 mL/Hr) IV Continuous <Continuous>  dextrose 5%. 1000 milliLiter(s) (75 mL/Hr) IV Continuous <Continuous>  dextrose 50% Injectable 12.5 Gram(s) IV Push once  dextrose 50% Injectable 25 Gram(s) IV Push once  dextrose 50% Injectable 25 Gram(s) IV Push once  enoxaparin Injectable 40 milliGRAM(s) SubCutaneous at bedtime  insulin glargine Injectable (LANTUS) 20 Unit(s) SubCutaneous at bedtime  insulin lispro Injectable (HumaLOG) 5 Unit(s) SubCutaneous every 6 hours  lactated ringers. 1000 milliLiter(s) (60 mL/Hr) IV Continuous <Continuous>  lactulose Syrup 15 Gram(s) Oral daily  pantoprazole  Injectable 40 milliGRAM(s) IV Push daily  senna 2 Tablet(s) Oral at bedtime    Home:    Vitals:  T(C): 36.1, Max: 37.3 (04-08-20 @ 19:00)  T(F): 97, Max: 99.1 (04-08-20 @ 19:00)  HR: 68 (50 - 99)  BP: 122/70 (105/62 - 155/70)  RR: 20 (20 - 27)  SpO2: 98% (98% - 98%)    Physical Exam:  General: Awake, Alert. Not in acute distress.  Heart: Regular rate and rhythm; S1, S2; No murmurs.  Lungs: Clear to auscultation bilaterally.  Abdomen: Soft, nontender, nondistended.  Extremities: No edema in upper or lower extremities.  Neuro: AAOx3, No focal deficits.    Labs:  CBC (04-09 @ 05:30)                        Hgb: 13.6   WBC: 10.89 )-----------------( Plts: 365                              Hct: 37.4     Chem (04-08 @ 06:11)  Na: 140  |     Cl: 98     |  BUN: 26  -----------------------------------------< Gluc: 223    K: 5.0   |    HCO3: 33  |  Cr: 0.6    Ca 8.3 (04-08 @ 06:11)  Mg 2.2 (04-07 @ 08:24)    LFTs (04-08 @ 06:11)  TPro 6.2  /  Alb 3.0  TBili 0.7  /  DBili     AST 56  /    /  AlkPhos 70          Microbiology:  Culture - Blood (collected 04-06 @ 07:12)  Source: .Blood None  Preliminary Report (04-07 @ 13:01):    No growth to date.        Radiology: Patient Information:  FARHAD FINK / 55y / Male / MRN#:[id number]    Hospital Day: 9d    HPI:    Interval History:  Patient seen and examined at bedside. No acute events overnight.    Past Medical History:  Psoriasis  No pertinent past medical history    Past Surgical History:  No significant past surgical history    Allergies:  No Known Allergies    Medications:  PRN:  acetaminophen   Tablet .. 650 milliGRAM(s) Oral every 6 hours PRN Temp greater or equal to 38C (100.4F), Mild Pain (1 - 3)  bisacodyl Suppository 10 milliGRAM(s) Rectal daily PRN Constipation  dextrose 40% Gel 15 Gram(s) Oral once PRN Blood Glucose LESS THAN 70 milliGRAM(s)/deciliter  glucagon  Injectable 1 milliGRAM(s) IntraMuscular once PRN Glucose LESS THAN 70 milligrams/deciliter    Standing:  chlorhexidine 4% Liquid 1 Application(s) Topical <User Schedule>  dextrose 5%. 1000 milliLiter(s) (50 mL/Hr) IV Continuous <Continuous>  dextrose 5%. 1000 milliLiter(s) (75 mL/Hr) IV Continuous <Continuous>  dextrose 50% Injectable 12.5 Gram(s) IV Push once  dextrose 50% Injectable 25 Gram(s) IV Push once  dextrose 50% Injectable 25 Gram(s) IV Push once  enoxaparin Injectable 40 milliGRAM(s) SubCutaneous at bedtime  insulin glargine Injectable (LANTUS) 20 Unit(s) SubCutaneous at bedtime  insulin lispro Injectable (HumaLOG) 5 Unit(s) SubCutaneous every 6 hours  lactated ringers. 1000 milliLiter(s) (60 mL/Hr) IV Continuous <Continuous>  lactulose Syrup 15 Gram(s) Oral daily  pantoprazole  Injectable 40 milliGRAM(s) IV Push daily  senna 2 Tablet(s) Oral at bedtime    Home:    Vitals:  T(C): 36.1, Max: 37.3 (04-08-20 @ 19:00)  T(F): 97, Max: 99.1 (04-08-20 @ 19:00)  HR: 68 (50 - 99)  BP: 122/70 (105/62 - 155/70)  RR: 20 (20 - 27)  SpO2: 98% (98% - 98%)    Physical Exam:  General: Awake, Alert. Not in acute distress.  Heart: Regular rate and rhythm; S1, S2; No murmurs.  Lungs: Clear to auscultation bilaterally.  Abdomen: Soft, nontender, nondistended.  Extremities: No edema in upper or lower extremities.  Neuro: No focal deficits.    Labs:  CBC (04-09 @ 05:30)                        Hgb: 13.6   WBC: 10.89 )-----------------( Plts: 365                              Hct: 37.4     Chem (04-08 @ 06:11)  Na: 140  |     Cl: 98     |  BUN: 26  -----------------------------------------< Gluc: 223    K: 5.0   |    HCO3: 33  |  Cr: 0.6    Ca 8.3 (04-08 @ 06:11)  Mg 2.2 (04-07 @ 08:24)    LFTs (04-08 @ 06:11)  TPro 6.2  /  Alb 3.0  TBili 0.7  /  DBili     AST 56  /    /  AlkPhos 70          Microbiology:  Culture - Blood (collected 04-06 @ 07:12)  Source: .Blood None  Preliminary Report (04-07 @ 13:01):    No growth to date.        Radiology:

## 2020-04-09 NOTE — CHART NOTE - NSCHARTNOTEFT_GEN_A_CORE
ICU Transfer Note    Transfer from: Vent MICU    Transfer to: ( x ) Medicine    (  ) Telemetry    (  ) RCU                               (  ) Palliative    (  ) Stroke Unit    (  ) MICU    (  ) CCU    Signout given to:     ----------------------------------------------------------------------------------------------------------  HPI / ICU COURSE:    54 yo M with history of psoriasis not on immunosuppression presents with COVID 19 transferred from [place] desaturating on NRB into 80s, tachypneic. The decision was made to intubate patient on 4/2/2020. In the MICU the patient was managed for Acute hypoxemic respiratory failure secondary to SARS COV 2 PNA, ARDS, and severe sepsis with SIRS and lactic acidosis.    [facility]  [facility]  HO psoriasis not on immunosuppression          Pressors during ICU course:  Antibiotics:  Lines:  Mary Lou:  --------------------------------------------------------------------------------------------------------  PMH/PSH:  PAST MEDICAL & SURGICAL HISTORY:  Psoriasis  No significant past surgical history      -------------------------------------------------------------------------------------------------------  PHYSICAL EXAM:  General: NAD  HEENT: Atraumatic  Respiratory: CTAB  Cardiac: RRR  Abdomen: soft, non-tender, non-distended  Extremities: warm and well-perfused  Neuro: A+Ox4. No FND    Vital Signs Last 24 Hrs  T(C): 36.7 (09 Apr 2020 09:27), Max: 37.3 (08 Apr 2020 19:00)  T(F): 98.1 (09 Apr 2020 09:27), Max: 99.1 (08 Apr 2020 19:00)  HR: 61 (09 Apr 2020 09:27) (54 - 99)  BP: 104/66 (09 Apr 2020 09:27) (104/66 - 155/70)  BP(mean): 79 (09 Apr 2020 09:27) (79 - 89)  RR: 23 (09 Apr 2020 09:27) (20 - 27)  SpO2: 95% (09 Apr 2020 11:53) (94% - 98%)    I&O's Summary    08 Apr 2020 07:01  -  09 Apr 2020 07:00  --------------------------------------------------------  IN: 1350 mL / OUT: 1850 mL / NET: -500 mL        --------------------------------------------------------------------------------------------------------  LABS:                               13.6   10.89 )-----------( 365      ( 09 Apr 2020 05:30 )             37.4       04-09    133<L>  |  95<L>  |  23<H>  ----------------------------<  225<H>  4.7   |  30  |  0.6<L>    Ca    8.4<L>      09 Apr 2020 05:30    TPro  6.4  /  Alb  3.0<L>  /  TBili  0.7  /  DBili  x   /  AST  33  /  ALT  86<H>  /  AlkPhos  75  04-09              CULTURE RESULTS:                04-06-20 @ 07:12  Specimen Source: --  Method Type: --  Gram Stain - RRL: --  Gram Stain - Wound: --  Bacteria: --  Culture Results:   No growth to date.      Specimen Source:   Method Type:   Gram Stain:   Culture Results: Culture Results:   No growth to date. (04-06-20 @ 07:12)    Bacteria:       -------------------------------------------------------------------------------------------------  RADIOLOGY:      ---------------------------------------------------------------------------------------------------  ASSESSMENT & PLAN:       FOR FOLLOW UP:  [ ]   [ ]   [ ] ICU Transfer Note    Transfer from: Vent MICU    Transfer to: ( x ) Medicine    (  ) Telemetry    (  ) RCU                               (  ) Palliative    (  ) Stroke Unit    (  ) MICU    (  ) CCU    Signout given to:     ----------------------------------------------------------------------------------------------------------  HPI / ICU COURSE:    54 yo M with history of psoriasis not on immunosuppression presents with COVID 19 transferred from [place] desaturating on NRB into 80s, tachypneic. The decision was made to intubate patient on 4/2/2020. In the MICU the patient was managed for Acute hypoxemic respiratory failure secondary to SARS COV 2 PNA, ARDS, and severe sepsis with SIRS and lactic acidosis.    Sepsis resolved and patient weaned off Levo. BCx showed no growth. Inflammatory markers trending down w/ Procalcitonin 0.06<--0.46<--0.38, and CRP 1.67<--24.6<--30.51    Patient extubated on 4/6/2020 and placed on NRB. Succesully transitioned to Venti mask today and eventually placed on NC saturating 93 % on 3LPM. The patient passed bedside Speech and Swallow assessment and is stable for downgrade to medical floor.      Pressors during ICU course: Levo  Antibiotics: Rocephin, Plaquenil  Lines: TLC, A-line  Mary Lou: Yes  --------------------------------------------------------------------------------------------------------  PMH/PSH:  PAST MEDICAL & SURGICAL HISTORY:  Psoriasis  No significant past surgical history      -------------------------------------------------------------------------------------------------------  PHYSICAL EXAM:  General: NAD  HEENT: Atraumatic  Respiratory: CTAB  Cardiac: RRR  Abdomen: soft, non-tender, non-distended  Extremities: warm and well-perfused, psoriasis.  Neuro: No FND    Vital Signs Last 24 Hrs  T(C): 36.7 (09 Apr 2020 09:27), Max: 37.3 (08 Apr 2020 19:00)  T(F): 98.1 (09 Apr 2020 09:27), Max: 99.1 (08 Apr 2020 19:00)  HR: 61 (09 Apr 2020 09:27) (54 - 99)  BP: 104/66 (09 Apr 2020 09:27) (104/66 - 155/70)  BP(mean): 79 (09 Apr 2020 09:27) (79 - 89)  RR: 23 (09 Apr 2020 09:27) (20 - 27)  SpO2: 95% (09 Apr 2020 11:53) (94% - 98%)    I&O's Summary    08 Apr 2020 07:01  -  09 Apr 2020 07:00  --------------------------------------------------------  IN: 1350 mL / OUT: 1850 mL / NET: -500 mL        --------------------------------------------------------------------------------------------------------  LABS:                               13.6   10.89 )-----------( 365      ( 09 Apr 2020 05:30 )             37.4       04-09    133<L>  |  95<L>  |  23<H>  ----------------------------<  225<H>  4.7   |  30  |  0.6<L>    Ca    8.4<L>      09 Apr 2020 05:30    TPro  6.4  /  Alb  3.0<L>  /  TBili  0.7  /  DBili  x   /  AST  33  /  ALT  86<H>  /  AlkPhos  75  04-09              CULTURE RESULTS:                04-06-20 @ 07:12  Specimen Source: --  Method Type: --  Gram Stain - RRL: --  Gram Stain - Wound: --  Bacteria: --  Culture Results:   No growth to date.      Specimen Source:   Method Type:   Gram Stain:   Culture Results: Culture Results:   No growth to date. (04-06-20 @ 07:12)    Bacteria:       -------------------------------------------------------------------------------------------------  RADIOLOGY:      ---------------------------------------------------------------------------------------------------  ASSESSMENT & PLAN:     54 y/o M from home with PMH of Psoriasis and no PSH presents with fever x 15 days to [facility], COVID 19 +, transferred to [facility] for bed availability, found to be hypoxic, requiring mechanical ventilation. S/p extubation 4/6/2020    Acute hypoxemic respiratory failure secondary to SARS COV 2 PNA  -resolved  -s/p extubation on 4/6/2020 and placed on NRB, currently on NC saturating 93% on 3LPM > titrate down on O2 as tolerated.   -, s/p EPS monitor removal on 4/5/2020  -s/p HCQ (finished 4/5/2020)  -finished Solumedrol 4/7/2020  -CRP 1.67, Procal 0.06    ARDS   - resolved    Severe sepsis with SIRS and lactic acidosis  - resolved  - s/p ceftriaxone (finished 4/6/2020)  - BCx NGTD    Psoriasis  - stable  - not on any Rx    FOR FOLLOW UP:  [ ] CRP, Procal q48hrs  [ ] Continue titrating down on O2 as tolerated

## 2020-04-09 NOTE — PROGRESS NOTE ADULT - ASSESSMENT
Assessment:    Acute hypoxemic respiratory failure   SARS-COV-2 infection   ARDS    PLAN:    CNS: Avoid sedatives     HEENT:  Oral care    PULMONARY:  HOB @ 45 degrees, Taper FIo2     CARDIOVASCULAR:  i=O    GI: GI prophylaxis Protonix                                         Feeding:  speech and swallow and feed    RENAL:  F/u  lytes.  Correct as needed. accurate I/O,     INFECTIOUS DISEASE: s/p abx    HEMATOLOGICAL:  DVT prophylaxis.    ENDOCRINE:  Follow up FS.  Insulin protocol if needed.      MUSCULOSKELETAL: Bed rest for now    CODE STATUS: FULL CODE    DISPOSITION: Downgrade to medical floor   SHAHRZAD Thompson, A line

## 2020-04-10 LAB
ALBUMIN SERPL ELPH-MCNC: 3.1 G/DL — LOW (ref 3.5–5.2)
ALP SERPL-CCNC: 79 U/L — SIGNIFICANT CHANGE UP (ref 30–115)
ALT FLD-CCNC: 86 U/L — HIGH (ref 0–41)
ANION GAP SERPL CALC-SCNC: 11 MMOL/L — SIGNIFICANT CHANGE UP (ref 7–14)
AST SERPL-CCNC: 40 U/L — SIGNIFICANT CHANGE UP (ref 0–41)
BASOPHILS # BLD AUTO: 0.01 K/UL — SIGNIFICANT CHANGE UP (ref 0–0.2)
BASOPHILS NFR BLD AUTO: 0.1 % — SIGNIFICANT CHANGE UP (ref 0–1)
BILIRUB SERPL-MCNC: 0.6 MG/DL — SIGNIFICANT CHANGE UP (ref 0.2–1.2)
BUN SERPL-MCNC: 22 MG/DL — HIGH (ref 10–20)
CALCIUM SERPL-MCNC: 8.7 MG/DL — SIGNIFICANT CHANGE UP (ref 8.5–10.1)
CHLORIDE SERPL-SCNC: 100 MMOL/L — SIGNIFICANT CHANGE UP (ref 98–110)
CO2 SERPL-SCNC: 29 MMOL/L — SIGNIFICANT CHANGE UP (ref 17–32)
CREAT SERPL-MCNC: 0.7 MG/DL — SIGNIFICANT CHANGE UP (ref 0.7–1.5)
CRP SERPL-MCNC: 0.65 MG/DL — HIGH (ref 0–0.4)
CULTURE RESULTS: SIGNIFICANT CHANGE UP
EOSINOPHIL # BLD AUTO: 0.13 K/UL — SIGNIFICANT CHANGE UP (ref 0–0.7)
EOSINOPHIL NFR BLD AUTO: 1.4 % — SIGNIFICANT CHANGE UP (ref 0–8)
GLUCOSE BLDC GLUCOMTR-MCNC: 164 MG/DL — HIGH (ref 70–99)
GLUCOSE BLDC GLUCOMTR-MCNC: 164 MG/DL — HIGH (ref 70–99)
GLUCOSE SERPL-MCNC: 112 MG/DL — HIGH (ref 70–99)
HCT VFR BLD CALC: 41.2 % — LOW (ref 42–52)
HGB BLD-MCNC: 14.6 G/DL — SIGNIFICANT CHANGE UP (ref 14–18)
IMM GRANULOCYTES NFR BLD AUTO: 0.6 % — HIGH (ref 0.1–0.3)
LYMPHOCYTES # BLD AUTO: 1.39 K/UL — SIGNIFICANT CHANGE UP (ref 1.2–3.4)
LYMPHOCYTES # BLD AUTO: 14.9 % — LOW (ref 20.5–51.1)
MAGNESIUM SERPL-MCNC: 2.3 MG/DL — SIGNIFICANT CHANGE UP (ref 1.8–2.4)
MCHC RBC-ENTMCNC: 30.3 PG — SIGNIFICANT CHANGE UP (ref 27–31)
MCHC RBC-ENTMCNC: 35.4 G/DL — SIGNIFICANT CHANGE UP (ref 32–37)
MCV RBC AUTO: 85.5 FL — SIGNIFICANT CHANGE UP (ref 80–94)
MONOCYTES # BLD AUTO: 0.57 K/UL — SIGNIFICANT CHANGE UP (ref 0.1–0.6)
MONOCYTES NFR BLD AUTO: 6.1 % — SIGNIFICANT CHANGE UP (ref 1.7–9.3)
NEUTROPHILS # BLD AUTO: 7.17 K/UL — HIGH (ref 1.4–6.5)
NEUTROPHILS NFR BLD AUTO: 76.9 % — HIGH (ref 42.2–75.2)
NRBC # BLD: 0 /100 WBCS — SIGNIFICANT CHANGE UP (ref 0–0)
PLATELET # BLD AUTO: 346 K/UL — SIGNIFICANT CHANGE UP (ref 130–400)
POTASSIUM SERPL-MCNC: 4.3 MMOL/L — SIGNIFICANT CHANGE UP (ref 3.5–5)
POTASSIUM SERPL-SCNC: 4.3 MMOL/L — SIGNIFICANT CHANGE UP (ref 3.5–5)
PROCALCITONIN SERPL-MCNC: 0.11 NG/ML — HIGH (ref 0.02–0.1)
PROT SERPL-MCNC: 6.5 G/DL — SIGNIFICANT CHANGE UP (ref 6–8)
RBC # BLD: 4.82 M/UL — SIGNIFICANT CHANGE UP (ref 4.7–6.1)
RBC # FLD: 11.4 % — LOW (ref 11.5–14.5)
SODIUM SERPL-SCNC: 140 MMOL/L — SIGNIFICANT CHANGE UP (ref 135–146)
SPECIMEN SOURCE: SIGNIFICANT CHANGE UP
WBC # BLD: 9.33 K/UL — SIGNIFICANT CHANGE UP (ref 4.8–10.8)
WBC # FLD AUTO: 9.33 K/UL — SIGNIFICANT CHANGE UP (ref 4.8–10.8)

## 2020-04-10 PROCEDURE — 99232 SBSQ HOSP IP/OBS MODERATE 35: CPT

## 2020-04-10 RX ORDER — PANTOPRAZOLE SODIUM 20 MG/1
40 TABLET, DELAYED RELEASE ORAL
Refills: 0 | Status: DISCONTINUED | OUTPATIENT
Start: 2020-04-11 | End: 2020-04-12

## 2020-04-10 RX ORDER — ENOXAPARIN SODIUM 100 MG/ML
40 INJECTION SUBCUTANEOUS
Refills: 0 | Status: DISCONTINUED | OUTPATIENT
Start: 2020-04-10 | End: 2020-04-12

## 2020-04-10 RX ADMIN — LACTULOSE 15 GRAM(S): 10 SOLUTION ORAL at 15:24

## 2020-04-10 RX ADMIN — Medication 5 UNIT(S): at 17:04

## 2020-04-10 RX ADMIN — ENOXAPARIN SODIUM 40 MILLIGRAM(S): 100 INJECTION SUBCUTANEOUS at 17:04

## 2020-04-10 RX ADMIN — SENNA PLUS 2 TABLET(S): 8.6 TABLET ORAL at 22:17

## 2020-04-10 NOTE — PROGRESS NOTE ADULT - SUBJECTIVE AND OBJECTIVE BOX
Patient is a 55y old  Male who presents with a chief complaint of COVID-19 Pneumonia (10 Apr 2020 11:23)    Over Night Events: none. doing well. no complaints on nasal canula     ROS:     -ve other than hpi    PHYSICAL EXAM    ICU Vital Signs Last 24 Hrs  T(C): 37 (10 Apr 2020 08:27), Max: 37 (10 Apr 2020 08:27)  T(F): 98.6 (10 Apr 2020 08:27), Max: 98.6 (10 Apr 2020 08:27)  HR: 62 (10 Apr 2020 08:27) (55 - 66)  BP: 92/84 (10 Apr 2020 08:27) (92/84 - 109/67)  BP(mean): 67 (10 Apr 2020 08:27) (67 - 83)  ABP: --  ABP(mean): --  RR: 20 (10 Apr 2020 00:00) (20 - 20)  SpO2: --      CONSTITUTIONAL:   Well nourished.  NAD    ENT:   Airway patent,   Mouth with normal mucosa.   No thrush    CARDIAC:   Normal rate,   Regular rhythm.     No edema      RESPIRATORY:   NO wheezing  Bilateral BS  Normal chest expansion  Not tachypneic,  No use of accessory muscles    GASTROINTESTINAL:  Abdomen soft,   Non-tender,   No guarding,   + BS    MUSCULOSKELETAL:   Range of motion is not limited,  No clubbing, cyanosis    NEUROLOGICAL:   Alert and oriented   No motor  deficits.  pertinent DTRs normal    SKIN:   Skin normal color for race,   warm, dry   No evidence of rash.        04-09-20 @ 07:01  -  04-10-20 @ 07:00  --------------------------------------------------------  IN:  Total IN: 0 mL    OUT:    Indwelling Catheter - Urethral: 920 mL    Voided: 1075 mL  Total OUT: 1995 mL    Total NET: -1995 mL          LABS:                            14.6   9.33  )-----------( 346      ( 10 Apr 2020 04:30 )             41.2                                               04-10    140  |  100  |  22<H>  ----------------------------<  112<H>  4.3   |  29  |  0.7    Ca    8.7      10 Apr 2020 04:30  Mg     2.3     04-10    TPro  6.5  /  Alb  3.1<L>  /  TBili  0.6  /  DBili  x   /  AST  40  /  ALT  86<H>  /  AlkPhos  79  04-10                                                                                           LIVER FUNCTIONS - ( 10 Apr 2020 04:30 )  Alb: 3.1 g/dL / Pro: 6.5 g/dL / ALK PHOS: 79 U/L / ALT: 86 U/L / AST: 40 U/L / GGT: x                                              MEDICATIONS  (STANDING):  chlorhexidine 4% Liquid 1 Application(s) Topical <User Schedule>  dextrose 5%. 1000 milliLiter(s) (50 mL/Hr) IV Continuous <Continuous>  dextrose 50% Injectable 12.5 Gram(s) IV Push once  dextrose 50% Injectable 25 Gram(s) IV Push once  dextrose 50% Injectable 25 Gram(s) IV Push once  enoxaparin Injectable 40 milliGRAM(s) SubCutaneous two times a day  insulin glargine Injectable (LANTUS) 20 Unit(s) SubCutaneous at bedtime  insulin lispro Injectable (HumaLOG) 5 Unit(s) SubCutaneous every 6 hours  lactated ringers. 1000 milliLiter(s) (60 mL/Hr) IV Continuous <Continuous>  lactulose Syrup 15 Gram(s) Oral daily  pantoprazole  Injectable 40 milliGRAM(s) IV Push daily  senna 2 Tablet(s) Oral at bedtime    MEDICATIONS  (PRN):  acetaminophen   Tablet .. 650 milliGRAM(s) Oral every 6 hours PRN Temp greater or equal to 38C (100.4F), Mild Pain (1 - 3)  bisacodyl Suppository 10 milliGRAM(s) Rectal daily PRN Constipation  dextrose 40% Gel 15 Gram(s) Oral once PRN Blood Glucose LESS THAN 70 milliGRAM(s)/deciliter  glucagon  Injectable 1 milliGRAM(s) IntraMuscular once PRN Glucose LESS THAN 70 milligrams/deciliter      New X-rays reviewed:                                                                                  ECHO    CXR interpreted by me:  no new

## 2020-04-10 NOTE — CHART NOTE - NSCHARTNOTEFT_GEN_A_CORE
ICU Transfer Note    Transfer from: Vent MICU    Transfer to: ( x ) Medicine    (  ) Telemetry    (  ) RCU                               (  ) Palliative    (  ) Stroke Unit    (  ) MICU    (  ) CCU    Signout given to:     ----------------------------------------------------------------------------------------------------------  HPI / ICU COURSE:    54 yo M with history of psoriasis not on immunosuppression presents with COVID 19 transferred from [place] desaturating on NRB into 80s, tachypneic. The decision was made to intubate patient on 4/2/2020. In the MICU the patient was managed for Acute hypoxemic respiratory failure secondary to SARS COV 2 PNA, ARDS, and severe sepsis with SIRS and lactic acidosis.    Sepsis resolved and patient weaned off Levo. BCx showed no growth. Inflammatory markers trending down w/ Procalcitonin 0.06<--0.46<--0.38, and CRP 1.67<--24.6<--30.51    Patient extubated on 4/6/2020 and placed on NRB. Succesully transitioned to Venti mask today and eventually placed on NC saturating 93 % on 3LPM. The patient passed bedside Speech and Swallow assessment and is stable for downgrade to medical floor.      Pressors during ICU course: Levo  Antibiotics: Rocephin, Plaquenil  Lines: TLC, A-line  Mary Lou: Yes  --------------------------------------------------------------------------------------------------------  PMH/PSH:  PAST MEDICAL & SURGICAL HISTORY:  Psoriasis  No significant past surgical history      -------------------------------------------------------------------------------------------------------  PHYSICAL EXAM:  General: NAD  HEENT: Atraumatic  Respiratory: CTAB  Cardiac: RRR  Abdomen: soft, non-tender, non-distended  Extremities: warm and well-perfused, psoriasis.  Neuro: No FND    Vital Signs Last 24 Hrs  T(C): 36.7 (09 Apr 2020 09:27), Max: 37.3 (08 Apr 2020 19:00)  T(F): 98.1 (09 Apr 2020 09:27), Max: 99.1 (08 Apr 2020 19:00)  HR: 61 (09 Apr 2020 09:27) (54 - 99)  BP: 104/66 (09 Apr 2020 09:27) (104/66 - 155/70)  BP(mean): 79 (09 Apr 2020 09:27) (79 - 89)  RR: 23 (09 Apr 2020 09:27) (20 - 27)  SpO2: 95% (09 Apr 2020 11:53) (94% - 98%)    I&O's Summary    08 Apr 2020 07:01  -  09 Apr 2020 07:00  --------------------------------------------------------  IN: 1350 mL / OUT: 1850 mL / NET: -500 mL        --------------------------------------------------------------------------------------------------------  LABS:                               13.6   10.89 )-----------( 365      ( 09 Apr 2020 05:30 )             37.4       04-09    133<L>  |  95<L>  |  23<H>  ----------------------------<  225<H>  4.7   |  30  |  0.6<L>    Ca    8.4<L>      09 Apr 2020 05:30    TPro  6.4  /  Alb  3.0<L>  /  TBili  0.7  /  DBili  x   /  AST  33  /  ALT  86<H>  /  AlkPhos  75  04-09              CULTURE RESULTS:                04-06-20 @ 07:12  Specimen Source: --  Method Type: --  Gram Stain - RRL: --  Gram Stain - Wound: --  Bacteria: --  Culture Results:   No growth to date.      Specimen Source:   Method Type:   Gram Stain:   Culture Results: Culture Results:   No growth to date. (04-06-20 @ 07:12)    Bacteria:       -------------------------------------------------------------------------------------------------  RADIOLOGY:      ---------------------------------------------------------------------------------------------------  ASSESSMENT & PLAN:     56 y/o M from home with PMH of Psoriasis and no PSH presents with fever x 15 days to [facility], COVID 19 +, transferred to [facility] for bed availability, found to be hypoxic, requiring mechanical ventilation. S/p extubation 4/6/2020    Acute hypoxemic respiratory failure secondary to SARS COV 2 PNA  -resolved  -s/p extubation on 4/6/2020 and placed on NRB, currently on NC saturating 93% on 3LPM > titrate down on O2 as tolerated.   -, s/p EPS monitor removal on 4/5/2020  -s/p HCQ (finished 4/5/2020)  -finished Solumedrol 4/7/2020  -CRP 1.67, Procal 0.06    ARDS   - resolved    Severe sepsis with SIRS and lactic acidosis  - resolved  - s/p ceftriaxone (finished 4/6/2020)  - BCx NGTD    Psoriasis  - stable  - not on any Rx    Diet: regular  DVT: lovenox q12  GI: Protonix  Code: Full    FOR FOLLOW UP:  [ ] CRP, Procal q48hrs  [ ] Continue titrating down on O2 as tolerated. ICU Transfer Note    Transfer from: Vent MICU    Transfer to: ( x ) Medicine    (  ) Telemetry    (  ) RCU                               (  ) Palliative    (  ) Stroke Unit    (  ) MICU    (  ) CCU    Signout given to:     ----------------------------------------------------------------------------------------------------------  HPI / ICU COURSE:    54 yo M with history of psoriasis not on immunosuppression presents with COVID 19 transferred from [place] desaturating on NRB into 80s, tachypneic. The decision was made to intubate patient on 4/2/2020. In the MICU the patient was managed for Acute hypoxemic respiratory failure secondary to SARS COV 2 PNA, ARDS, and severe sepsis with SIRS and lactic acidosis.    Sepsis resolved and patient weaned off Levo. BCx showed no growth. Inflammatory markers trending down w/ Procalcitonin 0.06<--0.46<--0.38, and CRP 1.67<--24.6<--30.51    Patient extubated on 4/6/2020 and placed on NRB. Succesully transitioned to Venti mask yesterday and eventually placed on NC saturating 96 % on 3LPM. The patient passed bedside Speech and Swallow assessment and is stable for downgrade to medical floor.      Pressors during ICU course: Levo  Antibiotics: Rocephin, Plaquenil  Lines: TLC, A-line  Mary Lou: Yes  --------------------------------------------------------------------------------------------------------  PMH/PSH:  PAST MEDICAL & SURGICAL HISTORY:  Psoriasis  No significant past surgical history      -------------------------------------------------------------------------------------------------------  PHYSICAL EXAM:  General: NAD  HEENT: Atraumatic  Respiratory: CTAB  Cardiac: RRR  Abdomen: soft, non-tender, non-distended  Extremities: warm and well-perfused, psoriasis.  Neuro: No FND    Vital Signs Last 24 Hrs  T(C): 36.7 (09 Apr 2020 09:27), Max: 37.3 (08 Apr 2020 19:00)  T(F): 98.1 (09 Apr 2020 09:27), Max: 99.1 (08 Apr 2020 19:00)  HR: 61 (09 Apr 2020 09:27) (54 - 99)  BP: 104/66 (09 Apr 2020 09:27) (104/66 - 155/70)  BP(mean): 79 (09 Apr 2020 09:27) (79 - 89)  RR: 23 (09 Apr 2020 09:27) (20 - 27)  SpO2: 95% (09 Apr 2020 11:53) (94% - 98%)    I&O's Summary    08 Apr 2020 07:01  -  09 Apr 2020 07:00  --------------------------------------------------------  IN: 1350 mL / OUT: 1850 mL / NET: -500 mL        --------------------------------------------------------------------------------------------------------  LABS:                               13.6   10.89 )-----------( 365      ( 09 Apr 2020 05:30 )             37.4       04-09    133<L>  |  95<L>  |  23<H>  ----------------------------<  225<H>  4.7   |  30  |  0.6<L>    Ca    8.4<L>      09 Apr 2020 05:30    TPro  6.4  /  Alb  3.0<L>  /  TBili  0.7  /  DBili  x   /  AST  33  /  ALT  86<H>  /  AlkPhos  75  04-09              CULTURE RESULTS:                04-06-20 @ 07:12  Specimen Source: --  Method Type: --  Gram Stain - RRL: --  Gram Stain - Wound: --  Bacteria: --  Culture Results:   No growth to date.      Specimen Source:   Method Type:   Gram Stain:   Culture Results: Culture Results:   No growth to date. (04-06-20 @ 07:12)    Bacteria:       -------------------------------------------------------------------------------------------------  RADIOLOGY:      ---------------------------------------------------------------------------------------------------  ASSESSMENT & PLAN:     56 y/o M from home with PMH of Psoriasis and no PSH presents with fever x 15 days to [facility], COVID 19 +, transferred to [facility] for bed availability, found to be hypoxic, requiring mechanical ventilation. S/p extubation 4/6/2020    Acute hypoxemic respiratory failure secondary to SARS COV 2 PNA  -resolved  -s/p extubation on 4/6/2020 and placed on NRB, currently on NC saturating 93% on 3LPM > titrate down on O2 as tolerated.   -, s/p EPS monitor removal on 4/5/2020  -s/p HCQ (finished 4/5/2020)  -finished Solumedrol 4/7/2020  -CRP 1.67, Procal 0.06    ARDS   - resolved    Severe sepsis with SIRS and lactic acidosis  - resolved  - s/p ceftriaxone (finished 4/6/2020)  - BCx NGTD    Psoriasis  - stable  - not on any Rx    Diet: regular  DVT: lovenox q12  GI: Protonix  Code: Full    FOR FOLLOW UP:  [ ] CRP, Procal q48hrs  [ ] Continue titrating down on O2 as tolerated. ICU Transfer Note    Transfer from: Vent MICU    Transfer to: ( x ) Medicine    (  ) Telemetry    (  ) RCU                               (  ) Palliative    (  ) Stroke Unit    (  ) MICU    (  ) CCU    Signout given to: Dr Mendieta    ----------------------------------------------------------------------------------------------------------  HPI / ICU COURSE:    56 yo M with history of psoriasis not on immunosuppression presents with COVID 19 transferred from [place] desaturating on NRB into 80s, tachypneic. The decision was made to intubate patient on 4/2/2020. In the MICU the patient was managed for Acute hypoxemic respiratory failure secondary to SARS COV 2 PNA, ARDS, and severe sepsis with SIRS and lactic acidosis.    Sepsis resolved and patient weaned off Levo. BCx showed no growth. Inflammatory markers trending down w/ Procalcitonin 0.06<--0.46<--0.38, and CRP 1.67<--24.6<--30.51    Patient extubated on 4/6/2020 and placed on NRB. Succesully transitioned to Venti mask yesterday and eventually placed on NC saturating 96 % on 3LPM. The patient passed bedside Speech and Swallow assessment and is stable for downgrade to medical floor.    The patient is a candidate for possible transfer to [facility].      Pressors during ICU course: Levo  Antibiotics: Rocephin, Plaquenil  Lines: TLC, A-line  Mary Lou: Yes  --------------------------------------------------------------------------------------------------------  PMH/PSH:  PAST MEDICAL & SURGICAL HISTORY:  Psoriasis  No significant past surgical history      -------------------------------------------------------------------------------------------------------  PHYSICAL EXAM:  General: NAD  HEENT: Atraumatic  Respiratory: CTAB  Cardiac: RRR  Abdomen: soft, non-tender, non-distended  Extremities: warm and well-perfused, psoriasis.  Neuro: No FND    Vital Signs Last 24 Hrs  T(C): 36.7 (09 Apr 2020 09:27), Max: 37.3 (08 Apr 2020 19:00)  T(F): 98.1 (09 Apr 2020 09:27), Max: 99.1 (08 Apr 2020 19:00)  HR: 61 (09 Apr 2020 09:27) (54 - 99)  BP: 104/66 (09 Apr 2020 09:27) (104/66 - 155/70)  BP(mean): 79 (09 Apr 2020 09:27) (79 - 89)  RR: 23 (09 Apr 2020 09:27) (20 - 27)  SpO2: 95% (09 Apr 2020 11:53) (94% - 98%)    I&O's Summary    08 Apr 2020 07:01  -  09 Apr 2020 07:00  --------------------------------------------------------  IN: 1350 mL / OUT: 1850 mL / NET: -500 mL        --------------------------------------------------------------------------------------------------------  LABS:                               13.6   10.89 )-----------( 365      ( 09 Apr 2020 05:30 )             37.4       04-09    133<L>  |  95<L>  |  23<H>  ----------------------------<  225<H>  4.7   |  30  |  0.6<L>    Ca    8.4<L>      09 Apr 2020 05:30    TPro  6.4  /  Alb  3.0<L>  /  TBili  0.7  /  DBili  x   /  AST  33  /  ALT  86<H>  /  AlkPhos  75  04-09              CULTURE RESULTS:                04-06-20 @ 07:12  Specimen Source: --  Method Type: --  Gram Stain - RRL: --  Gram Stain - Wound: --  Bacteria: --  Culture Results:   No growth to date.      Specimen Source:   Method Type:   Gram Stain:   Culture Results: Culture Results:   No growth to date. (04-06-20 @ 07:12)    Bacteria:       -------------------------------------------------------------------------------------------------  RADIOLOGY:      ---------------------------------------------------------------------------------------------------  ASSESSMENT & PLAN:     54 y/o M from home with PMH of Psoriasis and no PSH presents with fever x 15 days to [facility], COVID 19 +, transferred to [facility] for bed availability, found to be hypoxic, requiring mechanical ventilation. S/p extubation 4/6/2020    Acute hypoxemic respiratory failure secondary to SARS COV 2 PNA  -resolved  -s/p extubation on 4/6/2020 and placed on NRB, currently on NC saturating 93% on 3LPM > titrate down on O2 as tolerated.   -, s/p EPS monitor removal on 4/5/2020  -s/p HCQ (finished 4/5/2020)  -finished Solumedrol 4/7/2020  -CRP 1.67, Procal 0.06    ARDS   - resolved    Severe sepsis with SIRS and lactic acidosis  - resolved  - s/p ceftriaxone (finished 4/6/2020)  - BCx NGTD    Psoriasis  - stable  - not on any Rx    Diet: regular  DVT: lovenox q12  GI: Protonix  Code: Full    FOR FOLLOW UP:  [ ] CRP, Procal q48hrs  [ ] Continue titrating down on O2 as tolerated. ICU Transfer Note    Transfer from: Vent MICU    Transfer to: ( x ) Medicine    (  ) Telemetry    (  ) RCU                               (  ) Palliative    (  ) Stroke Unit    (  ) MICU    (  ) CCU    Signout given to: Dr Mendieta    ----------------------------------------------------------------------------------------------------------  HPI / ICU COURSE:    54 yo M with history of psoriasis not on immunosuppression presents with COVID 19 transferred from [place] desaturating on NRB into 80s, tachypneic. The decision was made to intubate patient on 4/2/2020. In the MICU the patient was managed for Acute hypoxemic respiratory failure secondary to SARS COV 2 PNA, ARDS, and severe sepsis with SIRS and lactic acidosis.    Sepsis resolved and patient weaned off Levo. BCx showed no growth. Inflammatory markers trending down w/ Procalcitonin 0.06<--0.46<--0.38, and CRP 1.67<--24.6<--30.51    Patient extubated on 4/6/2020 and placed on NRB. Succesully transitioned to Venti mask yesterday and eventually placed on NC saturating 96 % on 3LPM. The patient passed bedside Speech and Swallow assessment and is stable for downgrade to medical floor.    The patient is a candidate for possible transfer to [facility].      Pressors during ICU course: Levo  Antibiotics: Rocephin, Plaquenil  Lines: TLC, A-line  Mary Lou: Yes  --------------------------------------------------------------------------------------------------------  PMH/PSH:  PAST MEDICAL & SURGICAL HISTORY:  Psoriasis  No significant past surgical history      -------------------------------------------------------------------------------------------------------  PHYSICAL EXAM:  General: NAD  HEENT: Atraumatic  Respiratory: CTAB  Cardiac: RRR  Abdomen: soft, non-tender, non-distended  Extremities: warm and well-perfused, psoriasis.  Neuro: No FND    Vital Signs Last 24 Hrs  T(C): 36.7 (09 Apr 2020 09:27), Max: 37.3 (08 Apr 2020 19:00)  T(F): 98.1 (09 Apr 2020 09:27), Max: 99.1 (08 Apr 2020 19:00)  HR: 61 (09 Apr 2020 09:27) (54 - 99)  BP: 104/66 (09 Apr 2020 09:27) (104/66 - 155/70)  BP(mean): 79 (09 Apr 2020 09:27) (79 - 89)  RR: 23 (09 Apr 2020 09:27) (20 - 27)  SpO2: 95% (09 Apr 2020 11:53) (94% - 98%)    I&O's Summary    08 Apr 2020 07:01  -  09 Apr 2020 07:00  --------------------------------------------------------  IN: 1350 mL / OUT: 1850 mL / NET: -500 mL        --------------------------------------------------------------------------------------------------------  LABS:                               13.6   10.89 )-----------( 365      ( 09 Apr 2020 05:30 )             37.4       04-09    133<L>  |  95<L>  |  23<H>  ----------------------------<  225<H>  4.7   |  30  |  0.6<L>    Ca    8.4<L>      09 Apr 2020 05:30    TPro  6.4  /  Alb  3.0<L>  /  TBili  0.7  /  DBili  x   /  AST  33  /  ALT  86<H>  /  AlkPhos  75  04-09              CULTURE RESULTS:                04-06-20 @ 07:12  Specimen Source: --  Method Type: --  Gram Stain - RRL: --  Gram Stain - Wound: --  Bacteria: --  Culture Results:   No growth to date.      Specimen Source:   Method Type:   Gram Stain:   Culture Results: Culture Results:   No growth to date. (04-06-20 @ 07:12)    Bacteria:       -------------------------------------------------------------------------------------------------  RADIOLOGY:      ---------------------------------------------------------------------------------------------------  ASSESSMENT & PLAN:     56 y/o M from home with PMH of Psoriasis and no PSH presents with fever x 15 days to [facility], COVID 19 +, transferred to [facility] for bed availability, found to be hypoxic, requiring mechanical ventilation. S/p extubation 4/6/2020    Acute hypoxemic respiratory failure secondary to SARS COV 2 PNA  -resolved  -s/p extubation on 4/6/2020 and placed on NRB, currently on NC saturating 93% on 3LPM > titrate down on O2 as tolerated.   -, s/p EPS monitor removal on 4/5/2020  -s/p HCQ (finished 4/5/2020)  -finished Solumedrol 4/7/2020  -CRP 1.67, Procal 0.06    ARDS   - resolved    Severe sepsis with SIRS and lactic acidosis  - resolved  - s/p ceftriaxone (finished 4/6/2020)  - BCx NGTD    Psoriasis  - stable  - not on any Rx    Diet: regular  DVT: lovenox q12 as per ID due to elevated inflammatory markers  GI: Protonix  Code: Full    FOR FOLLOW UP:  [ ] CRP, Procal q48hrs  [ ] Continue titrating down on O2 as tolerated.

## 2020-04-10 NOTE — PROGRESS NOTE ADULT - ASSESSMENT
COVID-19 Pneumonia, septic shock requiring pressors , intubated  s/p plaquenil, azithro    Pt with Hypoxemia O2 <93% and CXR with b/l opacities   -significant elevation of inflammatory markers with possible progression to cytokine storm  Procalcitonin, Serum: 0.06 ng/mL (04-07-20 @ 08:24)  Procalcitonin, Serum: 0.46 (04-04-20 @ 06:59)    #Cytokine Release Syndrome   C-Reactive Protein, Serum: 1.67 mg/dL (04-07-20 @ 08:24)  on enoxaparin Injectable 40  eGFR if Non African American: 105 mL/min/1.73M2 (04-10-20 @ 04:30)  ?.9 (04-03-20 @ 12:52), BMI 35.8 (04-02-20 @ 11:17    RECOMMENDATIONS;  As per [place] Guidelines: increase enoxaparin Injectable 40 q12h  Please have nurse correct BMI on banner

## 2020-04-10 NOTE — CHART NOTE - NSCHARTNOTEFT_GEN_A_CORE
Registered Dietitian Follow-Up     Patient Profile Reviewed                           Yes [x]   No []     Nutrition History Previously Obtained        Yes []  No [x]--unable to obtain as pt and emergency contact unreachable via phone at this time        Pertinent Medical Interventions: Pt successfully transitioned to venti mask today and eventually placed on NC saturating 93 % on 3LPM. The patient passed bedside speech and swallow assessment and is stable for downgrade to medical floor.    Diet order: Regular--pt's diet advanced yesterday afternoon, unsure of exact po intake as nothing documented in EMR and unable to reach RN at this time.      Anthropometrics:  - Ht. 63"  - Wt. 215# (4/2)--no new wts   - %wt change  - BMI: 38.0  - IBW: 124#      Pertinent Lab Data: (4/10): BUN 22, Gluc 112     Pertinent Meds: lovenox, lactated ringers, insulin, dulcolax, lactulose, senna, protonix      Physical Findings:  - Appearance: alert and oriented; no edema noted currently  - GI function: LBM 4/9  - Oral/Mouth cavity: passed bedside S&S  - Skin: intact     Nutrition Requirements  Weight Used: IBW: 124#/56kg; needs continued from RD note on 4/7      Estimated Energy Needs: 2806-0810 kcal/day (25-30 kcal/kg IBW)--obese BMI considered    Estimated Protein Needs: 56-67 gm/day (1-1.2 gm/kg IBW)--same as above   Estimated Fluid Needs: per VENT team      Nutrient Intake: not meeting kcal/pro needs d/t recent diet advancement      Previous Nutrition Diagnosis: Inadequate protein intake (ongoing)     Nutrition Intervention: collaboration of care, meals and snacks     Recommendation:  1. Add CHO Consistent/HS diet modifier to current diet order      Goal/Expected Outcome: Pt to consume >50% po intake of meals and snacks within 4 days      Indicator/Monitoring: RD to monitor diet order, energy intake, body composition, glucose/renal profiles, nutrition focused physical findings

## 2020-04-10 NOTE — PROGRESS NOTE ADULT - SUBJECTIVE AND OBJECTIVE BOX
FARHAD FINK  55y, Male  Allergy: No Known Allergies      CHIEF COMPLAINT: COVID-19 Pneumonia (08 Apr 2020 23:26)      INTERVAL EVENTS/HPI  - No acute events overnight  - T(F): , Max: 98.6 (04-10-20 @ 08:27)  - Denies any worsening symptoms  - Tolerating medication  - WBC Count: 9.33 K/uL (04-10-20 @ 04:30)      ROS  General: Denies fevers, chills, nightsweats, weight loss  HEENT: Denies headache, rhinorrhea, sore throat, eye pain  CV: Denies CP, palpitations  PULM: Denies SOB, cough  GI: Denies abdominal pain, diarrhea  : Denies dysuria, hematuria  MSK: Denies arthralgias  SKIN: Denies rash   NEURO: Denies paresthesias, weakness  PSYCH: Denies depression    FH non-contributory   Social Hx non-contributory    VITALS:  T(F): 98.6, Max: 98.6 (04-10-20 @ 08:27)  HR: 62  BP: 92/84  RR: 20Vital Signs Last 24 Hrs  T(C): 37 (10 Apr 2020 08:27), Max: 37 (10 Apr 2020 08:27)  T(F): 98.6 (10 Apr 2020 08:27), Max: 98.6 (10 Apr 2020 08:27)  HR: 62 (10 Apr 2020 08:27) (55 - 66)  BP: 92/84 (10 Apr 2020 08:27) (92/84 - 109/67)  BP(mean): 67 (10 Apr 2020 08:27) (67 - 83)  RR: 20 (10 Apr 2020 00:00) (20 - 20)  SpO2: 95% (09 Apr 2020 11:53) (95% - 95%)    PHYSICAL EXAM:  Gen: NAD, resting in bed  HEENT: Normocephalic, atraumatic  Neck: supple, no lymphadenopathy  CV: Regular rate & regular rhythm  Lungs: decreased BS at bases, no fremitus  Abdomen: Soft, BS present  Ext: Warm, well perfused  Neuro: non focal, awake  Skin: no rash, no erythema      TESTS & MEASUREMENTS:                        14.6   9.33  )-----------( 346      ( 10 Apr 2020 04:30 )             41.2     04-10    140  |  100  |  22<H>  ----------------------------<  112<H>  4.3   |  29  |  0.7    Ca    8.7      10 Apr 2020 04:30  Mg     2.3     04-10    TPro  6.5  /  Alb  3.1<L>  /  TBili  0.6  /  DBili  x   /  AST  40  /  ALT  86<H>  /  AlkPhos  79  04-10    eGFR if Non African American: 105 mL/min/1.73M2 (04-10-20 @ 04:30)  eGFR if African American: 121 mL/min/1.73M2 (04-10-20 @ 04:30)    LIVER FUNCTIONS - ( 10 Apr 2020 04:30 )  Alb: 3.1 g/dL / Pro: 6.5 g/dL / ALK PHOS: 79 U/L / ALT: 86 U/L / AST: 40 U/L / GGT: x               Culture - Blood (collected 04-06-20 @ 07:12)  Source: .Blood None  Preliminary Report (04-07-20 @ 13:01):    No growth to date.            INFECTIOUS DISEASES TESTING      RADIOLOGY & ADDITIONAL TESTS:  I have personally reviewed the last Chest xray  CXR      CT      CARDIOLOGY TESTING  12 Lead ECG:   Ventricular Rate 84 BPM    Atrial Rate 84 BPM    P-R Interval 166 ms    QRS Duration 116 ms    Q-T Interval 362 ms    QTC Calculation(Bezet) 427 ms    P Axis 39 degrees    R Axis -29 degrees    T Axis 12 degrees    Diagnosis Line Normal sinus rhythm  Left axis deviation  Borderline EKG      Confirmed by ISIDORO ROSEN MD ([id number]) on 4/2/2020 10:59:02 PM (04-01-20 @ 11:22)  12 Lead ECG:   Ventricular Rate 98 BPM    Atrial Rate 98 BPM    P-R Interval 152 ms    QRS Duration 108 ms    Q-T Interval 344 ms    QTC Calculation(Bezet) 439 ms    P Axis 35 degrees    R Axis -42 degrees    T Axis 22 degrees    Diagnosis Line Normal sinus rhythm  Left axis deviation  Abnormal ECG    Confirmed by BARON MCGEE MDH ([id number]) on 3/31/2020 11:39:32 AM (03-31-20 @ 00:46)      MEDICATIONS  chlorhexidine 4% Liquid 1  dextrose 5%. 1000  dextrose 50% Injectable 12.5  dextrose 50% Injectable 25  dextrose 50% Injectable 25  enoxaparin Injectable 40  insulin glargine Injectable (LANTUS) 20  insulin lispro Injectable (HumaLOG) 5  lactated ringers. 1000  lactulose Syrup 15  pantoprazole  Injectable 40  senna 2      ANTIBIOTICS:      All available historical data has been reviewed FARHAD FINK  55y, Male  Allergy: No Known Allergies      CHIEF COMPLAINT: COVID-19 Pneumonia (08 Apr 2020 23:26)      INTERVAL EVENTS/HPI  - No acute events overnight  - T(F): , Max: 98.6 (04-10-20 @ 08:27)  - Denies any worsening symptoms  - Tolerating medication  - WBC Count: 9.33 K/uL (04-10-20 @ 04:30)      ROS  unable to obtain history secondary to patient's mental status and/or sedation     FH non-contributory   Social Hx non-contributory    VITALS:  T(F): 98.6, Max: 98.6 (04-10-20 @ 08:27)  HR: 62  BP: 92/84  RR: 20Vital Signs Last 24 Hrs  T(C): 37 (10 Apr 2020 08:27), Max: 37 (10 Apr 2020 08:27)  T(F): 98.6 (10 Apr 2020 08:27), Max: 98.6 (10 Apr 2020 08:27)  HR: 62 (10 Apr 2020 08:27) (55 - 66)  BP: 92/84 (10 Apr 2020 08:27) (92/84 - 109/67)  BP(mean): 67 (10 Apr 2020 08:27) (67 - 83)  RR: 20 (10 Apr 2020 00:00) (20 - 20)  SpO2: 95% (09 Apr 2020 11:53) (95% - 95%)    PHYSICAL EXAM:  see below      TESTS & MEASUREMENTS:                        14.6   9.33  )-----------( 346      ( 10 Apr 2020 04:30 )             41.2     04-10    140  |  100  |  22<H>  ----------------------------<  112<H>  4.3   |  29  |  0.7    Ca    8.7      10 Apr 2020 04:30  Mg     2.3     04-10    TPro  6.5  /  Alb  3.1<L>  /  TBili  0.6  /  DBili  x   /  AST  40  /  ALT  86<H>  /  AlkPhos  79  04-10    eGFR if Non African American: 105 mL/min/1.73M2 (04-10-20 @ 04:30)  eGFR if African American: 121 mL/min/1.73M2 (04-10-20 @ 04:30)    LIVER FUNCTIONS - ( 10 Apr 2020 04:30 )  Alb: 3.1 g/dL / Pro: 6.5 g/dL / ALK PHOS: 79 U/L / ALT: 86 U/L / AST: 40 U/L / GGT: x               Culture - Blood (collected 04-06-20 @ 07:12)  Source: .Blood None  Preliminary Report (04-07-20 @ 13:01):    No growth to date.            INFECTIOUS DISEASES TESTING      RADIOLOGY & ADDITIONAL TESTS:  I have personally reviewed the last Chest xray  CXR      CT      CARDIOLOGY TESTING  12 Lead ECG:   Ventricular Rate 84 BPM    Atrial Rate 84 BPM    P-R Interval 166 ms    QRS Duration 116 ms    Q-T Interval 362 ms    QTC Calculation(Bezet) 427 ms    P Axis 39 degrees    R Axis -29 degrees    T Axis 12 degrees    Diagnosis Line Normal sinus rhythm  Left axis deviation  Borderline EKG      Confirmed by ISIDORO ROSEN MD ([id number]) on 4/2/2020 10:59:02 PM (04-01-20 @ 11:22)  12 Lead ECG:   Ventricular Rate 98 BPM    Atrial Rate 98 BPM    P-R Interval 152 ms    QRS Duration 108 ms    Q-T Interval 344 ms    QTC Calculation(Bezet) 439 ms    P Axis 35 degrees    R Axis -42 degrees    T Axis 22 degrees    Diagnosis Line Normal sinus rhythm  Left axis deviation  Abnormal ECG    Confirmed by EVERARDO RAGSDALE, [facility] ([id number]) on 3/31/2020 11:39:32 AM (03-31-20 @ 00:46)      MEDICATIONS  chlorhexidine 4% Liquid 1  dextrose 5%. 1000  dextrose 50% Injectable 12.5  dextrose 50% Injectable 25  dextrose 50% Injectable 25  enoxaparin Injectable 40  insulin glargine Injectable (LANTUS) 20  insulin lispro Injectable (HumaLOG) 5  lactated ringers. 1000  lactulose Syrup 15  pantoprazole  Injectable 40  senna 2      ANTIBIOTICS:      All available historical data has been reviewed

## 2020-04-10 NOTE — PROGRESS NOTE ADULT - ASSESSMENT
Assessment:    Acute hypoxemic respiratory failure   SARS-COV-2 infection   ARDS    PLAN:    CNS: Avoid sedatives     HEENT:  Oral care    PULMONARY:  HOB @ 45 degrees, Taper FIo2     CARDIOVASCULAR:  i=O    GI: GI prophylaxis                                    Feeding:  oral diet    RENAL:  F/u  lytes.  Correct as needed. accurate I/O,     INFECTIOUS DISEASE: s/p abx    HEMATOLOGICAL:  DVT prophylaxis.    ENDOCRINE:  Follow up FS.  Insulin protocol if needed.      MUSCULOSKELETAL: OOB to chair    CODE STATUS: FULL CODE    DISPOSITION: Downgrade to medical floor

## 2020-04-11 LAB
ALBUMIN SERPL ELPH-MCNC: 2.9 G/DL — LOW (ref 3.5–5.2)
ALP SERPL-CCNC: 68 U/L — SIGNIFICANT CHANGE UP (ref 30–115)
ALT FLD-CCNC: 80 U/L — HIGH (ref 0–41)
ANION GAP SERPL CALC-SCNC: 9 MMOL/L — SIGNIFICANT CHANGE UP (ref 7–14)
AST SERPL-CCNC: 41 U/L — SIGNIFICANT CHANGE UP (ref 0–41)
BASOPHILS # BLD AUTO: 0.01 K/UL — SIGNIFICANT CHANGE UP (ref 0–0.2)
BASOPHILS NFR BLD AUTO: 0.1 % — SIGNIFICANT CHANGE UP (ref 0–1)
BILIRUB SERPL-MCNC: 0.5 MG/DL — SIGNIFICANT CHANGE UP (ref 0.2–1.2)
BUN SERPL-MCNC: 20 MG/DL — SIGNIFICANT CHANGE UP (ref 10–20)
CALCIUM SERPL-MCNC: 8.3 MG/DL — LOW (ref 8.5–10.1)
CHLORIDE SERPL-SCNC: 101 MMOL/L — SIGNIFICANT CHANGE UP (ref 98–110)
CO2 SERPL-SCNC: 29 MMOL/L — SIGNIFICANT CHANGE UP (ref 17–32)
CREAT SERPL-MCNC: 0.6 MG/DL — LOW (ref 0.7–1.5)
EOSINOPHIL # BLD AUTO: 0.11 K/UL — SIGNIFICANT CHANGE UP (ref 0–0.7)
EOSINOPHIL NFR BLD AUTO: 1.5 % — SIGNIFICANT CHANGE UP (ref 0–8)
GLUCOSE BLDC GLUCOMTR-MCNC: 168 MG/DL — HIGH (ref 70–99)
GLUCOSE SERPL-MCNC: 115 MG/DL — HIGH (ref 70–99)
HCT VFR BLD CALC: 39.7 % — LOW (ref 42–52)
HGB BLD-MCNC: 13.6 G/DL — LOW (ref 14–18)
IMM GRANULOCYTES NFR BLD AUTO: 0.8 % — HIGH (ref 0.1–0.3)
LYMPHOCYTES # BLD AUTO: 1.31 K/UL — SIGNIFICANT CHANGE UP (ref 1.2–3.4)
LYMPHOCYTES # BLD AUTO: 18 % — LOW (ref 20.5–51.1)
MAGNESIUM SERPL-MCNC: 2.3 MG/DL — SIGNIFICANT CHANGE UP (ref 1.8–2.4)
MCHC RBC-ENTMCNC: 29.5 PG — SIGNIFICANT CHANGE UP (ref 27–31)
MCHC RBC-ENTMCNC: 34.3 G/DL — SIGNIFICANT CHANGE UP (ref 32–37)
MCV RBC AUTO: 86.1 FL — SIGNIFICANT CHANGE UP (ref 80–94)
MONOCYTES # BLD AUTO: 0.53 K/UL — SIGNIFICANT CHANGE UP (ref 0.1–0.6)
MONOCYTES NFR BLD AUTO: 7.3 % — SIGNIFICANT CHANGE UP (ref 1.7–9.3)
NEUTROPHILS # BLD AUTO: 5.27 K/UL — SIGNIFICANT CHANGE UP (ref 1.4–6.5)
NEUTROPHILS NFR BLD AUTO: 72.3 % — SIGNIFICANT CHANGE UP (ref 42.2–75.2)
NRBC # BLD: 0 /100 WBCS — SIGNIFICANT CHANGE UP (ref 0–0)
PLATELET # BLD AUTO: 322 K/UL — SIGNIFICANT CHANGE UP (ref 130–400)
POTASSIUM SERPL-MCNC: 4 MMOL/L — SIGNIFICANT CHANGE UP (ref 3.5–5)
POTASSIUM SERPL-SCNC: 4 MMOL/L — SIGNIFICANT CHANGE UP (ref 3.5–5)
PROT SERPL-MCNC: 6.1 G/DL — SIGNIFICANT CHANGE UP (ref 6–8)
RBC # BLD: 4.61 M/UL — LOW (ref 4.7–6.1)
RBC # FLD: 11.6 % — SIGNIFICANT CHANGE UP (ref 11.5–14.5)
SODIUM SERPL-SCNC: 139 MMOL/L — SIGNIFICANT CHANGE UP (ref 135–146)
WBC # BLD: 7.29 K/UL — SIGNIFICANT CHANGE UP (ref 4.8–10.8)
WBC # FLD AUTO: 7.29 K/UL — SIGNIFICANT CHANGE UP (ref 4.8–10.8)

## 2020-04-11 PROCEDURE — 71045 X-RAY EXAM CHEST 1 VIEW: CPT | Mod: 26

## 2020-04-11 PROCEDURE — 99232 SBSQ HOSP IP/OBS MODERATE 35: CPT

## 2020-04-11 RX ADMIN — PANTOPRAZOLE SODIUM 40 MILLIGRAM(S): 20 TABLET, DELAYED RELEASE ORAL at 05:58

## 2020-04-11 RX ADMIN — CHLORHEXIDINE GLUCONATE 1 APPLICATION(S): 213 SOLUTION TOPICAL at 05:58

## 2020-04-11 RX ADMIN — ENOXAPARIN SODIUM 40 MILLIGRAM(S): 100 INJECTION SUBCUTANEOUS at 17:04

## 2020-04-11 RX ADMIN — LACTULOSE 15 GRAM(S): 10 SOLUTION ORAL at 11:47

## 2020-04-11 RX ADMIN — ENOXAPARIN SODIUM 40 MILLIGRAM(S): 100 INJECTION SUBCUTANEOUS at 05:58

## 2020-04-11 NOTE — PROGRESS NOTE ADULT - SUBJECTIVE AND OBJECTIVE BOX
FARHAD FINK 55y Male      Patient is a 55y old  Male who presents with a chief complaint of COVID-19 Pneumonia (10 Apr 2020 11:23)        INTERVAL HPI/OVERNIGHT EVENTS: No acute events overnight. Patient was seen and evaluated at the bedside. The patient denies pain. Vitals stable. Patient denies fever/chills, chest pain, shortness of breath, abdominal pain, headaches, nausea/vomiting, and diarrhea/constipation.      PHYSICAL EXAM:  GENERAL: NAD  HEAD:  Normocephalic  EYES:  conjunctiva and sclera clear  ENMT: Moist mucous membranes  NECK: Supple  NERVOUS SYSTEM:  Alert, awake  CHEST/LUNG: Good air exchange bilaterally, no wheeze  HEART: Regular rate and rhythm        Vital Signs Last 24 Hrs  T(C): 36.2 (11 Apr 2020 07:00), Max: 36.2 (11 Apr 2020 07:00)  T(F): 97.2 (11 Apr 2020 07:00), Max: 97.2 (11 Apr 2020 07:00)  HR: 64 (11 Apr 2020 07:00) (64 - 68)  BP: 91/54 (11 Apr 2020 07:00) (91/54 - 102/58)  BP(mean): --  RR: 18 (11 Apr 2020 07:00) (18 - 18)  SpO2: 98% (11 Apr 2020 07:00) (95% - 98%)      Consultant(s) Notes Reviewed:  [X] YES  [ ] NO  Care Discussed with Consultants/Other Providers [X] YES  [ ] NO  Imaging Personally Reviewed:  [X] YES  [ ] NO      LABS:                        13.6   7.29  )-----------( 322      ( 11 Apr 2020 06:30 )             39.7     04-11    139  |  101  |  20  ----------------------------<  115<H>  4.0   |  29  |  0.6<L>    Ca    8.3<L>      11 Apr 2020 06:30  Mg     2.3     04-11    TPro  6.1  /  Alb  2.9<L>  /  TBili  0.5  /  DBili  x   /  AST  41  /  ALT  80<H>  /  AlkPhos  68  04-11          CAPILLARY BLOOD GLUCOSE      POCT Blood Glucose.: 164 mg/dL (10 Apr 2020 21:14)  POCT Blood Glucose.: 164 mg/dL (10 Apr 2020 16:55)

## 2020-04-11 NOTE — CHART NOTE - NSCHARTNOTEFT_GEN_A_CORE
TRANSFER NOTE FROM [facility] TO [facility]     FARHAD FINK   MRN-[id number]    Vital Signs Last 24 Hrs  T(C): 36.2 (11 Apr 2020 07:00), Max: 36.2 (11 Apr 2020 07:00)  T(F): 97.2 (11 Apr 2020 07:00), Max: 97.2 (11 Apr 2020 07:00)  HR: 64 (11 Apr 2020 07:00) (64 - 68)  BP: 91/54 (11 Apr 2020 07:00) (91/54 - 102/58)  BP(mean): --  RR: 18 (11 Apr 2020 07:00) (18 - 18)  SpO2: 98% (11 Apr 2020 07:00) (95% - 98%)    (04-11 @ 06:30)                      13.6  7.29 )-----------( 322                 39.7    Neutrophils = 5.27 (72.3%)  Lymphocytes = 1.31 (18.0%)  Eosinophils = 0.11 (1.5%)  Basophils = 0.01 (0.1%)  Monocytes = 0.53 (7.3%)  Bands = --%    04-11    139  |  101  |  20  ----------------------------<  115<H>  4.0   |  29  |  0.6<L>    Ca    8.3<L>      11 Apr 2020 06:30  Mg     2.3     04-11    TPro  6.1  /  Alb  2.9<L>  /  TBili  0.5  /  DBili  x   /  AST  41  /  ALT  80<H>  /  AlkPhos  68  04-11          RVP:          Tox:               54 y/o M from home with PMH of Psoriasis and no PSH presents with fever x 15 days to [facility], COVID 19 +, transferred to [facility] for bed availability, found to be hypoxic, requiring mechanical ventilation. S/p extubation 4/6/2020    # Acute hypoxemic respiratory failure secondary to Covid-19 PNA, resolved  - s/p extubation on 4/6/2020 and placed on NRB, currently on NC saturating 93% on 3L, titrate down on O2 as tolerated.   - , s/p EPS monitor removal on 4/5/2020  - s/p Plaquenil finished 4/5 and finished Solumedrol 4/7  - CRP 1.67, Procal 0.06  - d-Dimer >8000, follow-up repeat and many consider anticoagulation with Xarelto   - follow-up CXR   - PT/OT  -     # Psoriasis  - Stable, not on any immunosuppressive medications     Diet: Carb Consistent   DVT: Lovenox q12 as per ID due to elevated inflammatory markers  GI: Protonix  Code: Full    For follow-up: clinical improvement, titrate oxygen, assess need for home O2, and PT/OT

## 2020-04-11 NOTE — PHYSICAL THERAPY INITIAL EVALUATION ADULT - GENERAL OBSERVATIONS, REHAB EVAL
Pt seen [id number] for a total of 60 minutes. Pt encountered semifowlers in bed, no apparent distress, agreeable to physical therapy, on2L O2 NC =96%. THALIA Alaniz present and O2 removed. OR remained between 88-93% until after standing and marching where 02 dropped to 85% on rolling walker. Pt placed back to bed and 3L O2 Placed, O2 increased to 89-93%.

## 2020-04-11 NOTE — PHYSICAL THERAPY INITIAL EVALUATION ADULT - IMPAIRMENTS FOUND, PT EVAL
gross motor/poor safety awareness/integumentary integrity/muscle strength/ergonomics and body mechanics/gait, locomotion, and balance/joint integrity and mobility/aerobic capacity/endurance/ROM

## 2020-04-11 NOTE — PHYSICAL THERAPY INITIAL EVALUATION ADULT - TRANSFER SAFETY CONCERNS NOTED: SIT/STAND, REHAB EVAL
decreased balance during turns/decreased proprioception/decreased weight-shifting ability/decreased safety awareness

## 2020-04-11 NOTE — PHYSICAL THERAPY INITIAL EVALUATION ADULT - PERTINENT HX OF CURRENT PROBLEM, REHAB EVAL
Pt adm for COVID 19 PNA on 3/31/2020 to [facility]. Pt intubated, extubated on 4/6/2020, placed on NRB.

## 2020-04-11 NOTE — PROGRESS NOTE ADULT - ASSESSMENT
54 y/o M from home with PMH of Psoriasis and no PSH presents with fever x 15 days to [facility], COVID 19 +, transferred to [facility] for bed availability, found to be hypoxic, requiring mechanical ventilation. S/p extubation 4/6/2020    # Acute hypoxemic respiratory failure secondary to Covid-19 PNA, resolved  - s/p extubation on 4/6/2020 and placed on NRB, currently on NC saturating 93% on 3L, titrate down on O2 as tolerated.   - , s/p EPS monitor removal on 4/5/2020  - s/p Plaquenil finished 4/5  - finished Solumedrol 4/7  - CRP 1.67, Procal 0.06  - d-Dimer >8000, follow-up repeat and many consider anticoagulation   - follow-up CXR   - PT/OT    # Psoriasis  - Stable, not on any immunosuppressive medications     Diet: Regular  DVT: Lovenox q12 as per ID due to elevated inflammatory markers  GI: Protonix  Code: Full

## 2020-04-11 NOTE — CHART NOTE - NSCHARTNOTEFT_GEN_A_CORE
56 y/o male presented from home with fever x 15 days to [facility], COVID 19 +, transferred to [facility] for bed availability, found to be hypoxic, requiring mechanical ventilation. S/p extubation 4/6/2020  PMH of Psoriasis  Transferred to [facility] on 04/11/20.  # Acute hypoxemic respiratory failure secondary to Covid-19 PNA, resolved  - s/p extubation on 4/6/2020 and placed on NRB currently on NC saturating 93% on 2.5L, titrate down on O2 as tolerated.   - , s/p EPS monitor removal on 4/5/2020  - s/p Plaquenil finished 4/5  - finished Solumedrol 4/7  - CRP 1.67, Procal 0.06  - d-Dimer >8000, follow-up / repeat and anticoagulation per protocol.  - PT/OT    # Psoriasis  - Stable, not on any immunosuppressive medications     Diet: Regular  DVT: Lovenox q12 as per ID due to elevated inflammatory markers  GI: Protonix  Code: Full

## 2020-04-11 NOTE — PHYSICAL THERAPY INITIAL EVALUATION ADULT - LEVEL OF INDEPENDENCE: SIT/STAND, REHAB EVAL
minimum assist (75% patients effort)/Prior to standing, Pt completed respiration exercises and ankle pumps and marching/knee extension exercises.

## 2020-04-11 NOTE — PHYSICAL THERAPY INITIAL EVALUATION ADULT - LEVEL OF INDEPENDENCE: STAND/SIT, REHAB EVAL
minimum assist (75% patients effort)/Pt able to stand for a few minutes online and perform marching.

## 2020-04-12 ENCOUNTER — TRANSCRIPTION ENCOUNTER (OUTPATIENT)
Age: 55
End: 2020-04-12

## 2020-04-12 VITALS
OXYGEN SATURATION: 91 % | RESPIRATION RATE: 20 BRPM | HEART RATE: 68 BPM | SYSTOLIC BLOOD PRESSURE: 109 MMHG | TEMPERATURE: 98 F | DIASTOLIC BLOOD PRESSURE: 66 MMHG

## 2020-04-12 LAB
ALBUMIN SERPL ELPH-MCNC: 3.1 G/DL — LOW (ref 3.5–5.2)
ALP SERPL-CCNC: 71 U/L — SIGNIFICANT CHANGE UP (ref 30–115)
ALT FLD-CCNC: 76 U/L — HIGH (ref 0–41)
ANION GAP SERPL CALC-SCNC: 10 MMOL/L — SIGNIFICANT CHANGE UP (ref 7–14)
AST SERPL-CCNC: 38 U/L — SIGNIFICANT CHANGE UP (ref 0–41)
BASOPHILS # BLD AUTO: 0.02 K/UL — SIGNIFICANT CHANGE UP (ref 0–0.2)
BASOPHILS NFR BLD AUTO: 0.3 % — SIGNIFICANT CHANGE UP (ref 0–1)
BILIRUB SERPL-MCNC: 0.4 MG/DL — SIGNIFICANT CHANGE UP (ref 0.2–1.2)
BUN SERPL-MCNC: 17 MG/DL — SIGNIFICANT CHANGE UP (ref 10–20)
CALCIUM SERPL-MCNC: 8.2 MG/DL — LOW (ref 8.5–10.1)
CHLORIDE SERPL-SCNC: 102 MMOL/L — SIGNIFICANT CHANGE UP (ref 98–110)
CO2 SERPL-SCNC: 28 MMOL/L — SIGNIFICANT CHANGE UP (ref 17–32)
CREAT SERPL-MCNC: 0.6 MG/DL — LOW (ref 0.7–1.5)
EOSINOPHIL # BLD AUTO: 0.13 K/UL — SIGNIFICANT CHANGE UP (ref 0–0.7)
EOSINOPHIL NFR BLD AUTO: 1.7 % — SIGNIFICANT CHANGE UP (ref 0–8)
GLUCOSE SERPL-MCNC: 125 MG/DL — HIGH (ref 70–99)
HCT VFR BLD CALC: 38.4 % — LOW (ref 42–52)
HGB BLD-MCNC: 13.7 G/DL — LOW (ref 14–18)
IMM GRANULOCYTES NFR BLD AUTO: 0.6 % — HIGH (ref 0.1–0.3)
LYMPHOCYTES # BLD AUTO: 1.16 K/UL — LOW (ref 1.2–3.4)
LYMPHOCYTES # BLD AUTO: 15.1 % — LOW (ref 20.5–51.1)
MAGNESIUM SERPL-MCNC: 2.2 MG/DL — SIGNIFICANT CHANGE UP (ref 1.8–2.4)
MCHC RBC-ENTMCNC: 30.9 PG — SIGNIFICANT CHANGE UP (ref 27–31)
MCHC RBC-ENTMCNC: 35.7 G/DL — SIGNIFICANT CHANGE UP (ref 32–37)
MCV RBC AUTO: 86.5 FL — SIGNIFICANT CHANGE UP (ref 80–94)
MONOCYTES # BLD AUTO: 0.55 K/UL — SIGNIFICANT CHANGE UP (ref 0.1–0.6)
MONOCYTES NFR BLD AUTO: 7.1 % — SIGNIFICANT CHANGE UP (ref 1.7–9.3)
NEUTROPHILS # BLD AUTO: 5.79 K/UL — SIGNIFICANT CHANGE UP (ref 1.4–6.5)
NEUTROPHILS NFR BLD AUTO: 75.2 % — SIGNIFICANT CHANGE UP (ref 42.2–75.2)
NRBC # BLD: 0 /100 WBCS — SIGNIFICANT CHANGE UP (ref 0–0)
PLATELET # BLD AUTO: 219 K/UL — SIGNIFICANT CHANGE UP (ref 130–400)
POTASSIUM SERPL-MCNC: 4.5 MMOL/L — SIGNIFICANT CHANGE UP (ref 3.5–5)
POTASSIUM SERPL-SCNC: 4.5 MMOL/L — SIGNIFICANT CHANGE UP (ref 3.5–5)
PROT SERPL-MCNC: 6.1 G/DL — SIGNIFICANT CHANGE UP (ref 6–8)
RBC # BLD: 4.44 M/UL — LOW (ref 4.7–6.1)
RBC # FLD: 11.5 % — SIGNIFICANT CHANGE UP (ref 11.5–14.5)
SODIUM SERPL-SCNC: 140 MMOL/L — SIGNIFICANT CHANGE UP (ref 135–146)
WBC # BLD: 7.7 K/UL — SIGNIFICANT CHANGE UP (ref 4.8–10.8)
WBC # FLD AUTO: 7.7 K/UL — SIGNIFICANT CHANGE UP (ref 4.8–10.8)

## 2020-04-12 RX ORDER — LACTULOSE 10 G/15ML
22.5 SOLUTION ORAL
Qty: 315 | Refills: 0
Start: 2020-04-12 | End: 2020-04-25

## 2020-04-12 RX ORDER — RIVAROXABAN 15 MG-20MG
1 KIT ORAL
Qty: 30 | Refills: 0
Start: 2020-04-12 | End: 2020-05-11

## 2020-04-12 RX ORDER — SENNA PLUS 8.6 MG/1
2 TABLET ORAL
Qty: 28 | Refills: 0
Start: 2020-04-12 | End: 2020-04-25

## 2020-04-12 RX ORDER — PANTOPRAZOLE SODIUM 20 MG/1
1 TABLET, DELAYED RELEASE ORAL
Qty: 14 | Refills: 0
Start: 2020-04-12 | End: 2020-04-25

## 2020-04-12 RX ADMIN — PANTOPRAZOLE SODIUM 40 MILLIGRAM(S): 20 TABLET, DELAYED RELEASE ORAL at 09:12

## 2020-04-12 RX ADMIN — ENOXAPARIN SODIUM 40 MILLIGRAM(S): 100 INJECTION SUBCUTANEOUS at 17:25

## 2020-04-12 RX ADMIN — ENOXAPARIN SODIUM 40 MILLIGRAM(S): 100 INJECTION SUBCUTANEOUS at 05:21

## 2020-04-12 RX ADMIN — LACTULOSE 15 GRAM(S): 10 SOLUTION ORAL at 13:43

## 2020-04-12 NOTE — DISCHARGE NOTE PROVIDER - NSDCMRMEDTOKEN_GEN_ALL_CORE_FT
lactulose 10 g/15 mL oral syrup: 22.5 milliliter(s) orally once a day  pantoprazole 40 mg oral delayed release tablet: 1 tab(s) orally once a day (before a meal)  senna oral tablet: 2 tab(s) orally once a day (at bedtime)  Xarelto 10 mg oral tablet: 1 tab(s) orally once a day

## 2020-04-12 NOTE — PROGRESS NOTE ADULT - ATTENDING COMMENTS
patient seen and examined, agree with above, AHRF/ ARDS/ COVID worsening status s/p intubation, MICU, poor prognosis
S: Patient reports no acute concerns today. Feeling well and has been ambulating without oxygen. Tolerating room air well.     O: Physical exam  Gen: Not in acute distress  CV: S1S2 present, RRR  Resp: Clear to auscultation bilaterally  Abd: Soft, nontender  ext: No LE edema bilaterally  Neuro: No gross focal deficits  Skin: Warm and dry    A/P:  1) COVID19 infection  - Possible DC today if tolerating room air well. Goal SaO2>90% on ambulation and at rest.  - Monitor for fevers and signs of respiratory distress    2) DVT ppx  - Enoxaparin 40mg BID  - Discharge with Xarelto 10mg daily x 30 days due to elevated D-dimer    3) Psoriasis  - Continue follow up with PMD and/or dermatologist as outpatient

## 2020-04-12 NOTE — DISCHARGE NOTE NURSING/CASE MANAGEMENT/SOCIAL WORK - PATIENT PORTAL LINK FT
You can access the FollowMyHealth Patient Portal offered by [facility] by registering at the following website: [email or web address]. By joining [employer]’s FollowMyHealth portal, you will also be able to view your health information using other applications (apps) compatible with our system.

## 2020-04-12 NOTE — OCCUPATIONAL THERAPY INITIAL EVALUATION ADULT - HEALTH SCREEN CRITERIA
Physician Discharge Summary     Patient ID:  Kiara Alcazar  [id number]  56 year old  1961    Admit date: 1/23/2018    Discharge date and time: 1/26/2018    Admitting Physician:Deepthi    Discharge Physician: Deepthi    Admission Diagnoses: Syndesmotic disruption of right ankle, subsequent encounter     Discharge Diagnoses: Syndesmotic disruption of right ankle, subsequent encounter , Blood Loss Anemia  Removal external fixator pin  Debridement pin site  Replacement external fixator pin x2    Admission Condition: Good    Discharged Condition: Good    Indication for Admission: Hardware removal and replacement    Hospital Course: No Complications    Consults: Hospitalist   Wound Care    Surgery: Removal external fixator pin  Debridement pin site  Replacement external fixator pin x2    Discharge Exam: Alert and comfortable. Wound healing well.    Disposition: Home with home health care    Patient Instructions:   Activity: No weight bearing on the right  Diet: Resume usual diet.  Wound care instructions: wound gel to tendon, Ac Flex, gauze, Kerlix, iodine around Pins with dressing change 3X weekly.  Follow-up: Dr Lacey and Wound Care as directed and scheduled.  PCP as directed   
yes

## 2020-04-12 NOTE — PROGRESS NOTE ADULT - SUBJECTIVE AND OBJECTIVE BOX
Medicine Progress Note    Patient is a 55y old  Male who presents with a chief complaint of COVID-19 Pneumonia (10 Apr 2020 11:23)  PMH of Psoriasis   COVID 19 +  requiring mechanical ventilation. s/p extubation 4/6/2020.    SUBJECTIVE / OVERNIGHT EVENTS:  Pt denies any new complaints.  Pt comfortable on 1.5L O2 via NC.    MEDICATIONS  (STANDING):  chlorhexidine 4% Liquid 1 Application(s) Topical <User Schedule>  dextrose 5%. 1000 milliLiter(s) (50 mL/Hr) IV Continuous <Continuous>  dextrose 50% Injectable 12.5 Gram(s) IV Push once  dextrose 50% Injectable 25 Gram(s) IV Push once  dextrose 50% Injectable 25 Gram(s) IV Push once  enoxaparin Injectable 40 milliGRAM(s) SubCutaneous two times a day  lactulose Syrup 15 Gram(s) Oral daily  pantoprazole    Tablet 40 milliGRAM(s) Oral before breakfast  senna 2 Tablet(s) Oral at bedtime    MEDICATIONS  (PRN):  acetaminophen   Tablet .. 650 milliGRAM(s) Oral every 6 hours PRN Temp greater or equal to 38C (100.4F), Mild Pain (1 - 3)  bisacodyl Suppository 10 milliGRAM(s) Rectal daily PRN Constipation  dextrose 40% Gel 15 Gram(s) Oral once PRN Blood Glucose LESS THAN 70 milliGRAM(s)/deciliter  glucagon  Injectable 1 milliGRAM(s) IntraMuscular once PRN Glucose LESS THAN 70 milligrams/deciliter        I&O's Summary    11 Apr 2020 07:01  -  12 Apr 2020 07:00  --------------------------------------------------------  IN: 400 mL / OUT: 0 mL / NET: 400 mL        PHYSICAL EXAM:  Vital Signs Last 24 Hrs  T(C): 36.6 (12 Apr 2020 13:00), Max: 36.7 (11 Apr 2020 21:00)  T(F): 97.8 (12 Apr 2020 13:00), Max: 98.1 (12 Apr 2020 00:30)  HR: 63 (12 Apr 2020 08:43) (63 - 90)  BP: 100/59 (12 Apr 2020 13:00) (93/57 - 139/60)  BP(mean): 69 (12 Apr 2020 08:43) (69 - 69)  RR: 15 (12 Apr 2020 13:00) (15 - 24)  SpO2: 95% (12 Apr 2020 13:00) (94% - 97%)  CONSTITUTIONAL: NAD, well-developed, well-groomed  ENMT: Moist oral mucosa, no pharyngeal injection or exudates; normal dentition  RESPIRATORY: Normal respiratory effort; right decreased breath sounds.  CARDIOVASCULAR: Regular rate and rhythm, normal S1 and S2, no murmur/rub/gallop; No lower extremity edema; Peripheral pulses are 2+ bilaterally  ABDOMEN: Nontender to palpation, normoactive bowel sounds, no rebound/guarding; No hepatosplenomegaly  PSYCH: A+O to person, place, and time; affect appropriate  NEUROLOGY: CN 2-12 are intact and symmetric; no gross sensory deficits   SKIN: No rashes; no palpable lesions    LABS:                        13.7   7.70  )-----------( 219      ( 12 Apr 2020 04:30 )             38.4     04-12    140  |  102  |  17  ----------------------------<  125<H>  4.5   |  28  |  0.6<L>    Ca    8.2<L>      12 Apr 2020 04:30  Mg     2.2     04-12    TPro  6.1  /  Alb  3.1<L>  /  TBili  0.4  /  DBili  x   /  AST  38  /  ALT  76<H>  /  AlkPhos  71  04-12              COVID-19 PCR: Detected (31 Mar 2020 09:00)

## 2020-04-12 NOTE — DISCHARGE NOTE PROVIDER - HOSPITAL COURSE
56 y/o male presented from home with fever x 15 days to [facility], COVID 19 +, transferred to [facility] for bed availability, found to be hypoxic, requiring mechanical ventilation. S/p extubation 4/6/2020    PMH of Psoriasis    Transferred to [facility] on 04/11/20.    Oxygenation improved on Room air

## 2020-04-12 NOTE — DISCHARGE NOTE PROVIDER - NSDCCPCAREPLAN_GEN_ALL_CORE_FT
PRINCIPAL DISCHARGE DIAGNOSIS  Diagnosis: COVID-19 virus detected  Assessment and Plan of Treatment:

## 2020-04-12 NOTE — PROGRESS NOTE ADULT - ASSESSMENT
Patient is a 55y old  Male who presents with a chief complaint of COVID-19 Pneumonia (10 Apr 2020 11:23)  PMH of Psoriasis   COVID 19 +  requiring mechanical ventilation. s/p extubation 4/6/2020.    # Acute hypoxemic respiratory failure secondary to Covid-19 PNA, resolved  - s/p extubation on 4/6/2020 and placed on NRB, currently on NC saturating 95% on 1.5L, titrated down to RA as tolerated.   - s/p Plaquenil finished 4/5  - CRP 1.67, Procal 0.06  - d-Dimer >8000, will require anticoagulation upon d/c    # Psoriasis  - Stable, not on any immunosuppressive medications     Diet: Regular  DVT: Lovenox q12 as per ID due to elevated inflammatory markers  GI: Protonix  Code: Full    Anticipate discharge to home today if O2 sat levels remain stable.

## 2020-04-12 NOTE — DISCHARGE NOTE PROVIDER - NSDCFUADDINST_GEN_ALL_CORE_FT
The symptoms you have been experiencing are due to infection with the novel cornavirus and subsequent development of COVID-19. Symptoms of the illness include fever, malaise, dry cough, and occasionally diarrhea, headache, loss of appetite, and disturbances in taste/smell. The virus is spread through respiratory droplets and contact with contaminated items/surfaces.    Your oxygen requirements have improved such that you do not require supplemental oxygen on ambulation. You are currently medically cleared for discharge. Please continue to self-quarantine for another 7 days. Wash your hands thoroughly (for at least 20 seconds with soap and warm water) and frequently. Avoid touching your face and cover your cough/sneeze. Wear a face mask if going outside and avoid close contact with others for at least 7 days.    If you experience any worsening or recurrence of your symptoms, particularly worsening or high fever, shortness of breath, extreme fatigue, bloody cough, chest pain, palpitations, if you are unable to keep down food/fluids, or if you experience dizziness, or fainting, please call 9-1-1 immediately or report to the nearest Emergency Department. If you have any questions or concerns, please do not hesitate to call the hospital at [phone].

## 2020-04-13 LAB — GLUCOSE BLDC GLUCOMTR-MCNC: 199 MG/DL — HIGH (ref 70–99)

## 2020-04-14 LAB
GLUCOSE BLDC GLUCOMTR-MCNC: 152 MG/DL — HIGH (ref 70–99)
GLUCOSE BLDC GLUCOMTR-MCNC: 198 MG/DL — HIGH (ref 70–99)

## 2020-04-27 ENCOUNTER — EMERGENCY (EMERGENCY)
Facility: HOSPITAL | Age: 55
LOS: 1 days | Discharge: HOME | End: 2020-04-27
Attending: EMERGENCY MEDICINE
Payer: MEDICAID

## 2020-04-27 VITALS
WEIGHT: 190.04 LBS | TEMPERATURE: 98 F | HEART RATE: 66 BPM | OXYGEN SATURATION: 97 % | RESPIRATION RATE: 17 BRPM | SYSTOLIC BLOOD PRESSURE: 134 MMHG | DIASTOLIC BLOOD PRESSURE: 83 MMHG | HEIGHT: 68 IN

## 2020-04-27 PROBLEM — L40.9 PSORIASIS, UNSPECIFIED: Chronic | Status: ACTIVE | Noted: 2020-03-31

## 2020-04-27 PROCEDURE — 71045 X-RAY EXAM CHEST 1 VIEW: CPT

## 2020-04-27 PROCEDURE — 99283 EMERGENCY DEPT VISIT LOW MDM: CPT | Mod: 25

## 2020-04-27 PROCEDURE — 71045 X-RAY EXAM CHEST 1 VIEW: CPT | Mod: 26

## 2020-04-27 PROCEDURE — 99283 EMERGENCY DEPT VISIT LOW MDM: CPT

## 2020-04-27 RX ORDER — BENZOCAINE AND MENTHOL 5; 1 G/100ML; G/100ML
1 LIQUID ORAL
Qty: 19 | Refills: 0
Start: 2020-04-27

## 2020-04-27 NOTE — ED PROVIDER NOTE - CLINICAL SUMMARY MEDICAL DECISION MAKING FREE TEXT BOX
Patient with complaints of a cough. Will obtain chest x-ray, and will likely discharge. Patient with complaints of a cough. Will obtain chest x-ray, and will likely discharge.  xray reveals improved infiltrates. home with cepacol lozenges for throat irritations

## 2020-04-27 NOTE — ED PROVIDER NOTE - PATIENT PORTAL LINK FT
You can access the FollowMyHealth Patient Portal offered by [facility] by registering at the following website: [email or web address]. By joining [employer]’s FollowMyHealth portal, you will also be able to view your health information using other applications (apps) compatible with our system.

## 2020-04-27 NOTE — ED ADULT TRIAGE NOTE - WEIGHT METHOD
I personally evaluated the patient. I reviewed the Resident’s or Physician Assistant’s note (as assigned above), and agree with the findings and plan except as documented in my note. Imaging displays + rb fractures, will admit to trauma for obsevation stated

## 2020-04-27 NOTE — ED PROVIDER NOTE - OBJECTIVE STATEMENT
55 year old male with PMHx of psoriasis and no pertinent PSHx presents to the ED with complaints of throat irritation and a cough. Patient reports that he was discharged from the hospital two weeks ago after being admitted and intubated for COVID-19. Patient states that he was put on Xarelto (d-dimer 8000) at the time. Patient is currently complaining of a cough and some irritation with speaking. Patient otherwise denies all other acute complaints. NKDA.

## 2020-04-27 NOTE — ED PROVIDER NOTE - NSFOLLOWUPINSTRUCTIONS_ED_ALL_ED_FT
Hoy puede o no selene sido examinado para detectar el virus COVID-19. Tendrá que aislarse hilton los próximos _____ días y luego puede salir del aislamiento siempre y cuando tenga 3 días sin fiebre (sin martinez ningún medicamento para la fiebre).    Para el dolor o la fiebre, puede martinez: Tylenol 1000 mg por vía oral cada 6 horas, según sea necesario. (Vernon 4000 mg en 24 horas).    Mantente roxana hidratado bebiendo mucha agua todos los días.    ** Regrese a la elsy de urgencias si comienza a tener dificultad para respirar, si jennifer síntomas empeoran o si le preocupa. De lo contrario, quédese en casa y aísle.    ** Si tiene amigos o familiares que tienen síntomas leves que pueden deberse al virus, dígales que se queden en casa    Quédese en casa: las personas que están levemente enfermas con COVID-19 pueden recuperarse en casa. No se vaya, excepto para obtener atención médica. No visitar áreas públicas.  Manténgase en contacto con reynoso médico. Llame antes de recibir atención médica. Asegúrese de recibir atención si se siente peor o si eliezer que es darryl emergencia.  Evite el transporte público: evite usar el transporte público, el transporte compartido o los taxis.  Manténgase alejado de los demás: tanto jose g sea posible, debe permanecer en darryl "habitación para enfermos" específica y lejos de otras personas en reynoso hogar. Use un baño separado, si está disponible.  Limite el contacto con mascotas y animales: debe restringir el contacto con mascotas y otros animales, kamla joseg  lo haría con otras personas.  Aunque no ibanez habido informes de mascotas u otros animales que se enfermen con COVID-19, aún se recomienda que las personas con el virus limiten el contacto con los animales hasta que se conozca más información.  Si está enfermo: debe usar darryl mascarilla cuando esté cerca de otras personas y antes de ingresar al consultorio de un proveedor de atención médica.  Si está cuidando a otros: si la persona enferma no puede usar darryl máscara facial (por ejemplo, porque causa problemas para respirar), las personas que viven en el hogar deben permanecer en darryl habitación diferente. Cuando los cuidadores ingresan a la habitación de la persona enferma, deben usar darryl máscara facial. No se recomiendan visitantes, aparte de los cuidadores.  Cubierta: Cubra reynoso boca y nariz con un pañuelo cuando tosa o estornude.  Eliminar: tirar los pañuelos usados ??en un bote de basura forrado.  Lávese las laith: Lávese las laith inmediatamente con agua y jabón hilton al menos 20 segundos. Si no hay agua y jabón disponibles, lávese las laith con un desinfectante para laith a base de alcohol que contenga al menos 60% de alcohol.  Desinfectante para laith: si no hay agua y jabón disponibles, use un desinfectante para laith a base de alcohol con al menos 60% de alcohol, cubriendo todas las superficies de jennifer laith y frotándolas hasta que se sientan secas.  Jabón y agua: el jabón y el agua son la mejor opción, especialmente si las laith están visiblemente sucias.  Evite tocar: evite tocarse los ojos, la nariz y la boca con las laith sin quintin.  No comparta: No comparta platos, vasos, vasos, utensilios para comer, toallas o ropa de cama con otras personas en reynoso hogar.  Lávese roxana después del uso: después de usar estos artículos, lávelos roxana con agua y jabón o póngalos en el lavavajias.  Limpie y desinfecte: Rutinariamente limpie las superficies de alto contacto en reynoso "habitación para enfermos" y baño. Deje que otra persona limpie y desinfecte las superficies en las áreas comunes, wilber no en reynoso habitación y baño.  Si un cuidador u otra persona necesita limpiar y desinfectar la habitación o el baño de darryl persona enferma, debe hacerlo según sea necesario. El cuidador / otra persona debe usar darryl máscara y esperar el mayor tiempo posible después de que la persona enferma haya usado el baño.  Las superficies de alto contacto incluyen teléfonos, controles remotos, mostradores, mesas, pomos de neelima, accesorios de baño, inodoros, teclados, tabletas y mesitas de noche.  Limpie y desinfecte las áreas que pueden tener elieser, heces o fluidos corporales.  Busque atención médica, wilber llame shy: busque atención médica de inmediato si reynoso enfermedad está empeorando (por ejemplo, si tiene dificultad para respirar).  Llame a reynoso médico antes de ingresar: Antes de ir al consultorio del médico o la elsy de emergencias, llame con anticipación y dígales jennifer síntomas. Te dirán qué hacer.  Use darryl máscara facial: si es posible, póngase darryl máscara facial antes de ingresar al edificio. Si no puede ponerse darryl máscara facial, trate de mantener darryl distancia cooley de otras personas (al menos a 6 pies de distancia). Baird ayudará a proteger a las personas en la oficina o en la elsy de espera.  Siga las instrucciones de cuidado de reynoso proveedor de atención médica y el departamento de amilcar local: las autoridades de amilcar locales le darán instrucciones sobre cómo controlar jennifer síntomas y reportar información.  Llame al 911 si tiene darryl emergencia médica: si tiene darryl emergencia médica y necesita llamar al 911, notifique al operador que tiene o eliezer que podría tener COVID-19. Si es posible, póngase darryl mascarilla antes de que llegue la ayuda médica.

## 2020-05-01 ENCOUNTER — APPOINTMENT (OUTPATIENT)
Dept: INTERNAL MEDICINE | Facility: CLINIC | Age: 55
End: 2020-05-01
Payer: COMMERCIAL

## 2020-05-01 DIAGNOSIS — B37.2 CANDIDIASIS OF SKIN AND NAIL: ICD-10-CM

## 2020-05-01 DIAGNOSIS — Z78.9 OTHER SPECIFIED HEALTH STATUS: ICD-10-CM

## 2020-05-01 DIAGNOSIS — J12.82 COVID-19: ICD-10-CM

## 2020-05-01 DIAGNOSIS — Z00.00 ENCOUNTER FOR GENERAL ADULT MEDICAL EXAMINATION W/OUT ABNORMAL FINDINGS: ICD-10-CM

## 2020-05-01 DIAGNOSIS — U07.1 COVID-19: ICD-10-CM

## 2020-05-01 PROCEDURE — 99203 OFFICE O/P NEW LOW 30 MIN: CPT | Mod: 95

## 2020-05-01 RX ORDER — PANTOPRAZOLE 40 MG/1
40 TABLET, DELAYED RELEASE ORAL DAILY
Qty: 30 | Refills: 3 | Status: ACTIVE | COMMUNITY
Start: 2020-05-01 | End: 1900-01-01

## 2020-05-01 RX ORDER — RIVAROXABAN 10 MG/1
10 TABLET, FILM COATED ORAL
Qty: 30 | Refills: 3 | Status: ACTIVE | COMMUNITY
Start: 2020-05-01

## 2020-05-01 RX ORDER — BENZONATATE 100 MG/1
100 CAPSULE ORAL 3 TIMES DAILY
Qty: 60 | Refills: 3 | Status: ACTIVE | COMMUNITY
Start: 2020-05-01 | End: 1900-01-01

## 2020-05-01 RX ORDER — TERBINAFINE HYDROCHLORIDE 1 G/100G
1 CREAM TOPICAL 3 TIMES DAILY
Qty: 1 | Refills: 5 | Status: ACTIVE | COMMUNITY
Start: 2020-05-01 | End: 1900-01-01

## 2020-05-01 NOTE — HISTORY OF PRESENT ILLNESS
[Medical Office: ([facility])___] : at the medical office located in  [Home] : at home, [unfilled] , at the time of the visit. [Spouse] : spouse [Self] : self [Other:____] : [unfilled] [Patient] : the patient [Post-hospitalization from ___ Hospital] : Post-hospitalization from [unfilled] Hospital [FreeTextEntry4] : [facility] [Admitted on: ___] : The patient was admitted on [unfilled] [Discharged on ___] : discharged on [unfilled] [Discharge Summary] : discharge summary [Pertinent Labs] : pertinent labs [Discharge Med List] : discharge medication list [Radiology Findings] : radiology findings [Med Reconciliation] : medication reconciliation has been completed [FreeTextEntry2] : 55yoM, Maltese speaking, son Hi present as , no significant PMH presents after 3/31/-4/12 hospitalization for COVID pneumonia.  Patient was intubated and extubated on 4/6\par discharged on xarelto 10mg daily for D-dimer > 8000\par adherent to xarelto, takes PPI intermittently\par \par son able to check vitals:\par no fever, P 65 /93, 02 sat 96%\par \par patient endorses mild cough, nonitchy nondraining red rash lower abdomen [Patient discharged from recent hospitalization for COVID-19] : Patient discharged from recent hospitalization for COVID-19 [Severe] : severe [Yes] : yes

## 2020-05-01 NOTE — PLAN
[Labs] : labs [Patient/Family instructed to call PCP if any worsening or new symptoms] : Patient/Family instructed to call PCP if any worsening or new symptoms [FreeTextEntry1] : f/u in [facility] on Tuesday, 5/5 [60284 - High Complexity requires an extensive number of possible diagnoses and/or the management options, extensive complexity of the medical data (tests, etc.) to be reviewed, and a high risk of significant complications, morbidity, and/or mortality as w] : High Complexity

## 2020-05-01 NOTE — HEALTH RISK ASSESSMENT
[] : No [No] : No [HEF3Osryd] : 0 [Reports changes in hearing] : Reports no changes in hearing [0] : 2) Feeling down, depressed, or hopeless: Not at all (0) [Reports changes in dental health] : Reports no changes in dental health [Reports changes in vision] : Reports no changes in vision

## 2020-05-01 NOTE — PHYSICAL EXAM
[No Acute Distress] : no acute distress [Well-Appearing] : well-appearing [No Respiratory Distress] : no respiratory distress  [Normal Voice/Communication] : normal voice/communication [Coordination Grossly Intact] : coordination grossly intact [No Accessory Muscle Use] : no accessory muscle use [Normal Affect] : the affect was normal [Normal Mood] : the mood was normal [de-identified] : speaking in full sentences, no audible wheeze [Normal Insight/Judgement] : insight and judgment were intact [de-identified] : could not see clearly on cell phone screen but appears to be red flat patches across lower abdomen, under pannus, with satellite lesions upper abdomen; no drainage

## 2020-05-05 DIAGNOSIS — E11.9 TYPE 2 DIABETES MELLITUS W/OUT COMPLICATIONS: ICD-10-CM

## 2020-05-05 LAB
25(OH)D3 SERPL-MCNC: 13.7 NG/ML
ALBUMIN SERPL ELPH-MCNC: 4.1 G/DL
ALP BLD-CCNC: 75 U/L
ALT SERPL-CCNC: 21 U/L
ANION GAP SERPL CALC-SCNC: 10 MMOL/L
AST SERPL-CCNC: 20 U/L
BASOPHILS # BLD AUTO: 0.04 K/UL
BASOPHILS NFR BLD AUTO: 0.5 %
BILIRUB SERPL-MCNC: 0.2 MG/DL
BUN SERPL-MCNC: 13 MG/DL
CALCIUM SERPL-MCNC: 9 MG/DL
CHLORIDE SERPL-SCNC: 102 MMOL/L
CHOLEST SERPL-MCNC: 226 MG/DL
CHOLEST/HDLC SERPL: 6.6 RATIO
CO2 SERPL-SCNC: 29 MMOL/L
CREAT SERPL-MCNC: 0.75 MG/DL
CRP SERPL-MCNC: 0.29 MG/DL
DEPRECATED D DIMER PPP IA-ACNC: 326 NG/ML DDU
EOSINOPHIL # BLD AUTO: 0.16 K/UL
EOSINOPHIL NFR BLD AUTO: 2.1 %
ESTIMATED AVERAGE GLUCOSE: 160 MG/DL
FERRITIN SERPL-MCNC: 350 NG/ML
GLUCOSE SERPL-MCNC: 135 MG/DL
HBA1C MFR BLD HPLC: 7.2 %
HCT VFR BLD CALC: 37.6 %
HDLC SERPL-MCNC: 35 MG/DL
HGB BLD-MCNC: 12.6 G/DL
IMM GRANULOCYTES NFR BLD AUTO: 0.3 %
LDLC SERPL CALC-MCNC: NORMAL MG/DL
LDLC SERPL DIRECT ASSAY-MCNC: 137 MG/DL
LYMPHOCYTES # BLD AUTO: 2.19 K/UL
LYMPHOCYTES NFR BLD AUTO: 29.3 %
MAN DIFF?: NORMAL
MCHC RBC-ENTMCNC: 30 PG
MCHC RBC-ENTMCNC: 33.5 GM/DL
MCV RBC AUTO: 89.5 FL
MONOCYTES # BLD AUTO: 0.64 K/UL
MONOCYTES NFR BLD AUTO: 8.6 %
NEUTROPHILS # BLD AUTO: 4.42 K/UL
NEUTROPHILS NFR BLD AUTO: 59.2 %
PLATELET # BLD AUTO: 256 K/UL
POTASSIUM SERPL-SCNC: 4 MMOL/L
PROT SERPL-MCNC: 7 G/DL
RBC # BLD: 4.2 M/UL
RBC # FLD: 13.9 %
SODIUM SERPL-SCNC: 140 MMOL/L
TRIGL SERPL-MCNC: 437 MG/DL
VIT B12 SERPL-MCNC: 778 PG/ML
WBC # FLD AUTO: 7.47 K/UL

## 2020-05-05 RX ORDER — METFORMIN HYDROCHLORIDE 500 MG/1
500 TABLET, EXTENDED RELEASE ORAL
Qty: 60 | Refills: 5 | Status: ACTIVE | COMMUNITY
Start: 2020-05-05 | End: 1900-01-01

## 2020-05-28 ENCOUNTER — APPOINTMENT (OUTPATIENT)
Dept: INTERNAL MEDICINE | Facility: CLINIC | Age: 55
End: 2020-05-28

## 2021-10-06 PROBLEM — U07.1 PNEUMONIA DUE TO COVID-19 VIRUS: Status: ACTIVE | Noted: 2020-05-01

## 2022-08-01 NOTE — OCCUPATIONAL THERAPY INITIAL EVALUATION ADULT - LEVEL OF INDEPENDENCE: TOILET, REHAB EVAL
independent Arava Counseling:  Patient counseled regarding adverse effects of Arava including but not limited to nausea, vomiting, abnormalities in liver function tests. Patients may develop mouth sores, rash, diarrhea, and abnormalities in blood counts. The patient understands that monitoring is required including LFTs and blood counts.  There is a rare possibility of scarring of the liver and lung problems that can occur when taking methotrexate. Persistent nausea, loss of appetite, pale stools, dark urine, cough, and shortness of breath should be reported immediately. Patient advised to discontinue Arava treatment and consult with a physician prior to attempting conception. The patient will have to undergo a treatment to eliminate Arava from the body prior to conception.

## 2023-05-02 ENCOUNTER — EMERGENCY (EMERGENCY)
Facility: HOSPITAL | Age: 58
LOS: 1 days | Discharge: HOME | End: 2023-05-02
Attending: EMERGENCY MEDICINE
Payer: MEDICAID

## 2023-05-02 VITALS
SYSTOLIC BLOOD PRESSURE: 151 MMHG | HEART RATE: 74 BPM | RESPIRATION RATE: 18 BRPM | OXYGEN SATURATION: 99 % | TEMPERATURE: 98 F | DIASTOLIC BLOOD PRESSURE: 76 MMHG

## 2023-05-02 VITALS
DIASTOLIC BLOOD PRESSURE: 82 MMHG | SYSTOLIC BLOOD PRESSURE: 164 MMHG | RESPIRATION RATE: 19 BRPM | TEMPERATURE: 98 F | HEART RATE: 69 BPM | OXYGEN SATURATION: 99 % | WEIGHT: 198.42 LBS | HEIGHT: 64.57 IN

## 2023-05-02 PROCEDURE — 73030 X-RAY EXAM OF SHOULDER: CPT | Mod: 26,RT

## 2023-05-02 PROCEDURE — 73030 X-RAY EXAM OF SHOULDER: CPT

## 2023-05-02 PROCEDURE — 72125 CT NECK SPINE W/O DYE: CPT | Mod: 26,MA

## 2023-05-02 PROCEDURE — 73562 X-RAY EXAM OF KNEE 3: CPT | Mod: 26,50

## 2023-05-02 PROCEDURE — 99285 EMERGENCY DEPT VISIT HI MDM: CPT

## 2023-05-02 PROCEDURE — 99284 EMERGENCY DEPT VISIT MOD MDM: CPT | Mod: 25

## 2023-05-02 PROCEDURE — 72125 CT NECK SPINE W/O DYE: CPT | Mod: MA

## 2023-05-02 PROCEDURE — 73562 X-RAY EXAM OF KNEE 3: CPT

## 2023-05-02 RX ORDER — IBUPROFEN 200 MG
600 TABLET ORAL ONCE
Refills: 0 | Status: COMPLETED | OUTPATIENT
Start: 2023-05-02 | End: 2023-05-02

## 2023-05-02 RX ADMIN — Medication 600 MILLIGRAM(S): at 22:51

## 2023-05-02 RX ADMIN — Medication 30 MILLILITER(S): at 22:51

## 2023-05-02 NOTE — ED PROVIDER NOTE - NSFOLLOWUPINSTRUCTIONS_ED_ALL_ED_FT
please use ice packs to area 3x/day 20 mins each session, take motrin 600mg every 6 hrs as needed, tylenol 650mg every 4 hrs as needed, stay active, no heavy lifting and return if symptoms  worsens. see your MD for physical therapy.  the rain or when it is about to rain makes your symptoms worse    use bolsas de hielo en el área 3 veces al día hilton 20 minutos cada sesión, tome motrin 600 mg cada 6 horas según sea necesario, tylenol 650 mg cada 4 horas según sea necesario, manténgase activo, no levante objetos pesados ??y regrese si los síntomas empeoran. consulte a reynoso médico para terapia física.  la johnnie o cuando está a punto de llover empeora jennifer síntomas please use ice packs to area 3x/day 20 mins each session, take tylenol 650mg every 4 hrs as needed, stay active, no heavy lifting and return if symptoms  worsens. see your MD for physical therapy.  the rain or when it is about to rain makes your symptoms worse    use bolsas de hielo en el área 3 veces al día hilton 20 minutos cada sesión, tome tylenol 650 mg cada 4 horas según sea necesario, manténgase activo, no levante objetos pesados ??y regrese si los síntomas empeoran. consulte a reynoso médico para terapia física.  la johnnie o cuando está a punto de llover empeora jennifer síntomas

## 2023-05-02 NOTE — ED PROVIDER NOTE - OBJECTIVE STATEMENT
58 yr old male with hx of psoriasis, HTN, HLD, cad stent, DM but not taking metformin over 1.5 month and glucose been normal presents to ed c/o right shoulder, knees, neck pain x 4 days. tylenol helps but didn't take anything today. no fever, no uri sx, mild cough. throat pain started midday. able to swallow. no change in voice. no alcohol, no drugs or smoking.

## 2023-05-02 NOTE — ED PROVIDER NOTE - PROGRESS NOTE DETAILS
Andujar: abc intact. xr shows arthritis. ct cervical also with arthritis. Andujar: abc intact. xr shows arthritis. ct cervical also with arthritis. informed pt to not take nsaid since on plavix and asa. pt also informed to see pcp for night sweats for a general physical. pt without weigh loss or hemoptysis.

## 2023-05-02 NOTE — ED PROVIDER NOTE - CLINICAL SUMMARY MEDICAL DECISION MAKING FREE TEXT BOX
58 yr old male with hx of psoriasis, HTN, HLD, cad stent, DM but not taking metformin over 1.5 month and glucose been normal presents to ed c/o right shoulder, knees, neck pain x 4 days. tylenol helps but didn't take anything today. no fever, no uri sx, mild cough. throat pain started midday. able to swallow. no change in voice. no alcohol, no drugs or smoking.    likely arthritis valarie with heavy rain during the time period pt c/o of pain. no new activity or trauma. no clinical sign of inflammatory or septic process. xr, ct cervical. throat discomfort. no clinical infectious or abc compromise. possibly early uri vs mild reflux? will give maalox and motrin

## 2023-05-02 NOTE — ED PROVIDER NOTE - ENMT, MLM
Airway patent, Nasal mucosa clear. Mouth with normal mucosa. Throat has no vesicles, no oropharyngeal exudates and uvula is midline. no swelling, maintaining oral secretions, no neck fullness. no stridor

## 2023-05-02 NOTE — ED PROVIDER NOTE - MUSCULOSKELETAL, MLM
b/l crepitus with no swelling of knee l > R. right shoulder no deformity, no swelling, no erythema. + from mild pain with abduction

## 2023-05-02 NOTE — ED PROVIDER NOTE - PATIENT PORTAL LINK FT
You can access the FollowMyHealth Patient Portal offered by [facility] by registering at the following website: [email or web address]. By joining [employer]’s FollowMyHealth portal, you will also be able to view your health information using other applications (apps) compatible with our system.

## 2023-05-25 NOTE — CONSULT NOTE ADULT - LYMPH NODES
[facility] En[facility]    Provider: Tonya Haase, APRN CNP  Facility: [facility] Facility Type:  TCU    Caller: Itzel  Call Back Number: [phone]    Allergies:    Allergies   Allergen Reactions     Cephalexin Rash        Reason for call: Nurse reporting that patient is a new admission from [facility].  He came with a PICC line, however the PICC line was supposed to have been discontinued in the hospital but the hospital stated they forgot to discontinue it.      Verbal Order/Direction given by Provider: Discontinue PICC line.  Contact hospital to confirm the length of the PICC line to make sure the entire line is removed.      Provider giving Order:  Henna Orozco MD    Verbal Order given to: Susan Ray RN       No lymphadedenopathy

## 2024-09-18 NOTE — ED ADULT NURSE NOTE - CARDIO ASSESSMENT
Spoke with Lexi Matias regarding their recent IR procedure:  IR Procedure List for Pre-Call: Port Removal     Discussed follow-up care. Answered all questions and concerns at this time.   
WDL

## 2025-03-19 NOTE — ED PROVIDER NOTE - NEUROLOGICAL, MLM
Discharge instructions reviewed  
Alert and oriented, no focal deficits, no motor or sensory deficits.